# Patient Record
Sex: MALE | Race: BLACK OR AFRICAN AMERICAN | Employment: UNEMPLOYED | URBAN - METROPOLITAN AREA
[De-identification: names, ages, dates, MRNs, and addresses within clinical notes are randomized per-mention and may not be internally consistent; named-entity substitution may affect disease eponyms.]

---

## 2018-04-03 ENCOUNTER — APPOINTMENT (OUTPATIENT)
Dept: GENERAL RADIOLOGY | Age: 43
DRG: 193 | End: 2018-04-03
Attending: EMERGENCY MEDICINE
Payer: MEDICARE

## 2018-04-03 ENCOUNTER — HOSPITAL ENCOUNTER (INPATIENT)
Age: 43
LOS: 2 days | Discharge: HOME HEALTH | DRG: 193 | End: 2018-04-06
Attending: EMERGENCY MEDICINE | Admitting: INTERNAL MEDICINE
Payer: MEDICARE

## 2018-04-03 DIAGNOSIS — J18.9 COMMUNITY ACQUIRED PNEUMONIA OF LEFT LOWER LOBE OF LUNG: Primary | ICD-10-CM

## 2018-04-03 DIAGNOSIS — G83.9 SPASTIC PARALYSIS (HCC): ICD-10-CM

## 2018-04-03 DIAGNOSIS — R09.02 HYPOXIA: ICD-10-CM

## 2018-04-03 LAB
ALBUMIN SERPL-MCNC: 3.3 G/DL (ref 3.4–5)
ALBUMIN/GLOB SERPL: 1 {RATIO} (ref 0.8–1.7)
ALP SERPL-CCNC: 69 U/L (ref 45–117)
ALT SERPL-CCNC: 26 U/L (ref 16–61)
ANION GAP SERPL CALC-SCNC: 7 MMOL/L (ref 3–18)
APPEARANCE UR: CLEAR
AST SERPL-CCNC: 32 U/L (ref 15–37)
BASOPHILS # BLD: 0 K/UL (ref 0–0.1)
BASOPHILS NFR BLD: 0 % (ref 0–2)
BILIRUB SERPL-MCNC: 0.5 MG/DL (ref 0.2–1)
BILIRUB UR QL: NEGATIVE
BUN SERPL-MCNC: 9 MG/DL (ref 7–18)
BUN/CREAT SERPL: 10 (ref 12–20)
CALCIUM SERPL-MCNC: 8.3 MG/DL (ref 8.5–10.1)
CHLORIDE SERPL-SCNC: 107 MMOL/L (ref 100–108)
CO2 SERPL-SCNC: 26 MMOL/L (ref 21–32)
COLOR UR: YELLOW
CREAT SERPL-MCNC: 0.89 MG/DL (ref 0.6–1.3)
DIFFERENTIAL METHOD BLD: ABNORMAL
EOSINOPHIL # BLD: 0 K/UL (ref 0–0.4)
EOSINOPHIL NFR BLD: 0 % (ref 0–5)
ERYTHROCYTE [DISTWIDTH] IN BLOOD BY AUTOMATED COUNT: 12.8 % (ref 11.6–14.5)
FLUAV AG NPH QL IA: NEGATIVE
FLUBV AG NOSE QL IA: NEGATIVE
GLOBULIN SER CALC-MCNC: 3.3 G/DL (ref 2–4)
GLUCOSE SERPL-MCNC: 105 MG/DL (ref 74–99)
GLUCOSE UR STRIP.AUTO-MCNC: NEGATIVE MG/DL
HCT VFR BLD AUTO: 36.8 % (ref 36–48)
HGB BLD-MCNC: 12.5 G/DL (ref 13–16)
HGB UR QL STRIP: NEGATIVE
KETONES UR QL STRIP.AUTO: ABNORMAL MG/DL
LACTATE BLD-SCNC: 0.5 MMOL/L (ref 0.4–2)
LEUKOCYTE ESTERASE UR QL STRIP.AUTO: NEGATIVE
LYMPHOCYTES # BLD: 0.4 K/UL (ref 0.9–3.6)
LYMPHOCYTES NFR BLD: 8 % (ref 21–52)
MAGNESIUM SERPL-MCNC: 2 MG/DL (ref 1.6–2.6)
MCH RBC QN AUTO: 31.5 PG (ref 24–34)
MCHC RBC AUTO-ENTMCNC: 34 G/DL (ref 31–37)
MCV RBC AUTO: 92.7 FL (ref 74–97)
MONOCYTES # BLD: 0.3 K/UL (ref 0.05–1.2)
MONOCYTES NFR BLD: 7 % (ref 3–10)
NEUTS SEG # BLD: 4.1 K/UL (ref 1.8–8)
NEUTS SEG NFR BLD: 85 % (ref 40–73)
NITRITE UR QL STRIP.AUTO: NEGATIVE
PH UR STRIP: 6.5 [PH] (ref 5–8)
PLATELET # BLD AUTO: 201 K/UL (ref 135–420)
PMV BLD AUTO: 10.1 FL (ref 9.2–11.8)
POTASSIUM SERPL-SCNC: 4 MMOL/L (ref 3.5–5.5)
PROT SERPL-MCNC: 6.6 G/DL (ref 6.4–8.2)
PROT UR STRIP-MCNC: NEGATIVE MG/DL
RBC # BLD AUTO: 3.97 M/UL (ref 4.7–5.5)
SODIUM SERPL-SCNC: 140 MMOL/L (ref 136–145)
SP GR UR REFRACTOMETRY: 1.01 (ref 1–1.03)
T4 FREE SERPL-MCNC: 0.9 NG/DL (ref 0.7–1.5)
TSH SERPL DL<=0.05 MIU/L-ACNC: 0.23 UIU/ML (ref 0.36–3.74)
UROBILINOGEN UR QL STRIP.AUTO: 1 EU/DL (ref 0.2–1)
WBC # BLD AUTO: 4.8 K/UL (ref 4.6–13.2)

## 2018-04-03 PROCEDURE — 83735 ASSAY OF MAGNESIUM: CPT | Performed by: EMERGENCY MEDICINE

## 2018-04-03 PROCEDURE — 87040 BLOOD CULTURE FOR BACTERIA: CPT | Performed by: EMERGENCY MEDICINE

## 2018-04-03 PROCEDURE — 81003 URINALYSIS AUTO W/O SCOPE: CPT | Performed by: EMERGENCY MEDICINE

## 2018-04-03 PROCEDURE — 87804 INFLUENZA ASSAY W/OPTIC: CPT | Performed by: EMERGENCY MEDICINE

## 2018-04-03 PROCEDURE — 84443 ASSAY THYROID STIM HORMONE: CPT | Performed by: EMERGENCY MEDICINE

## 2018-04-03 PROCEDURE — 77030013033 HC MSK BPAP/CPAP MMKA -B

## 2018-04-03 PROCEDURE — 77030005514 HC CATH URETH FOL14 BARD -A

## 2018-04-03 PROCEDURE — 99285 EMERGENCY DEPT VISIT HI MDM: CPT

## 2018-04-03 PROCEDURE — 87086 URINE CULTURE/COLONY COUNT: CPT | Performed by: EMERGENCY MEDICINE

## 2018-04-03 PROCEDURE — 84439 ASSAY OF FREE THYROXINE: CPT | Performed by: EMERGENCY MEDICINE

## 2018-04-03 PROCEDURE — 85025 COMPLETE CBC W/AUTO DIFF WBC: CPT | Performed by: EMERGENCY MEDICINE

## 2018-04-03 PROCEDURE — 74011250637 HC RX REV CODE- 250/637: Performed by: EMERGENCY MEDICINE

## 2018-04-03 PROCEDURE — 83605 ASSAY OF LACTIC ACID: CPT

## 2018-04-03 PROCEDURE — 71045 X-RAY EXAM CHEST 1 VIEW: CPT

## 2018-04-03 PROCEDURE — 93005 ELECTROCARDIOGRAM TRACING: CPT

## 2018-04-03 PROCEDURE — 80053 COMPREHEN METABOLIC PANEL: CPT | Performed by: EMERGENCY MEDICINE

## 2018-04-03 PROCEDURE — 96361 HYDRATE IV INFUSION ADD-ON: CPT

## 2018-04-03 PROCEDURE — 74011250636 HC RX REV CODE- 250/636: Performed by: EMERGENCY MEDICINE

## 2018-04-03 RX ORDER — SODIUM CHLORIDE 0.9 % (FLUSH) 0.9 %
5-10 SYRINGE (ML) INJECTION AS NEEDED
Status: DISCONTINUED | OUTPATIENT
Start: 2018-04-03 | End: 2018-04-06 | Stop reason: HOSPADM

## 2018-04-03 RX ORDER — ACETAMINOPHEN 500 MG
1000 TABLET ORAL
Status: COMPLETED | OUTPATIENT
Start: 2018-04-03 | End: 2018-04-03

## 2018-04-03 RX ADMIN — ACETAMINOPHEN 1000 MG: 500 TABLET ORAL at 23:03

## 2018-04-03 RX ADMIN — SODIUM CHLORIDE 1770 ML: 900 INJECTION, SOLUTION INTRAVENOUS at 20:34

## 2018-04-03 NOTE — IP AVS SNAPSHOT
[address] [place] [address] 
[phone] Patient: Shoshana Armstrong MRN: [id number] :1975 A check idania indicates which time of day the medication should be taken. My Medications START taking these medications Instructions Each Dose to Equal  
 Morning Noon Evening Bedtime  
 levoFLOXacin 750 mg tablet Commonly known as:  Opal Maria Victoria Your last dose was: Your next dose is: Take 1 Tab by mouth daily for 7 days. 750 mg  
    
   
   
   
  
 oxyCODONE-acetaminophen 5-325 mg per tablet Commonly known as:  PERCOCET Your last dose was: Your next dose is: Take 1 Tab by mouth every four (4) hours as needed. Max Daily Amount: 6 Tabs. 1 Tab CONTINUE taking these medications Instructions Each Dose to Equal  
 Morning Noon Evening Bedtime BACLOFEN PO Your last dose was: Your next dose is: Take  by mouth. tamsulosin 0.4 mg capsule Commonly known as:  FLOMAX Your last dose was: Your next dose is: Take 1 Cap by mouth daily (after dinner). 0.4 mg  
    
   
   
   
  
 VALIUM PO Your last dose was: Your next dose is: Take  by mouth. VITAMIN D2 50,000 unit capsule Generic drug:  ergocalciferol Your last dose was: Your next dose is: Take 50,000 Units by mouth. 22406 Units Where to Get Your Medications Information on where to get these meds will be given to you by the nurse or doctor. ! Ask your nurse or doctor about these medications  
  levoFLOXacin 750 mg tablet  
 oxyCODONE-acetaminophen 5-325 mg per tablet

## 2018-04-03 NOTE — IP AVS SNAPSHOT
[address] [address] 
[phone] Patient: Flavio Hernandez MRN: [id number] :1975 About your hospitalization You were admitted on:  2018 You last received care in the:  [facility] 2 CV STEPDOWN You were discharged on:  2018 Why you were hospitalized Your primary diagnosis was:  Not on File Your diagnoses also included:  Cap (Community Acquired Pneumonia) Follow-up Information Follow up With Details Comments Contact Info Yuni Michael MD On 2018 @[address] [address] 
[phone] Discharge Orders None A check idania indicates which time of day the medication should be taken. My Medications START taking these medications Instructions Each Dose to Equal  
 Morning Noon Evening Bedtime  
 levoFLOXacin 750 mg tablet Commonly known as:  Shania Mexia Your last dose was: Your next dose is: Take 1 Tab by mouth daily for 7 days. 750 mg  
    
   
   
   
  
 oxyCODONE-acetaminophen 5-325 mg per tablet Commonly known as:  PERCOCET Your last dose was: Your next dose is: Take 1 Tab by mouth every four (4) hours as needed. Max Daily Amount: 6 Tabs. 1 Tab CONTINUE taking these medications Instructions Each Dose to Equal  
 Morning Noon Evening Bedtime BACLOFEN PO Your last dose was: Your next dose is: Take  by mouth. tamsulosin 0.4 mg capsule Commonly known as:  FLOMAX Your last dose was: Your next dose is: Take 1 Cap by mouth daily (after dinner). 0.4 mg  
    
   
   
   
  
 VALIUM PO Your last dose was: Your next dose is: Take  by mouth. VITAMIN D2 50,000 unit capsule Generic drug:  ergocalciferol Your last dose was: Your next dose is: Take 50,000 Units by mouth. 91873 Units Where to Get Your Medications Information on where to get these meds will be given to you by the nurse or doctor. ! Ask your nurse or doctor about these medications  
  levoFLOXacin 750 mg tablet  
 oxyCODONE-acetaminophen 5-325 mg per tablet Opioid Education Prescription Opioids: What You Need to Know: 
 
Prescription opioids can be used to help relieve moderate-to-severe pain and are often prescribed following a surgery or injury, or for certain health conditions. These medications can be an important part of treatment but also come with serious risks. Opioids are strong pain medicines. Examples include hydrocodone, oxycodone, fentanyl, and morphine. Heroin is an example of an illegal opioid. It is important to work with your health care provider to make sure you are getting the safest, most effective care. WHAT ARE THE RISKS AND SIDE EFFECTS OF OPIOID USE? Prescription opioids carry serious risks of addiction and overdose, especially with prolonged use. An opioid overdose, often marked by slow breathing, can cause sudden death. The use of prescription opioids can have a number of side effects as well, even when taken as directed. · Tolerance-meaning you might need to take more of a medication for the same pain relief · Physical dependence-meaning you have symptoms of withdrawal when the medication is stopped. Withdrawal symptoms can include nausea, sweating, chills, diarrhea, stomach cramps, and muscle aches. Withdrawal can last up to several weeks, depending on which drug you took and how long you took it. · Increased sensitivity to pain · Constipation · Nausea, vomiting, and dry mouth · Sleepiness and dizziness · Confusion · Depression · Low levels of testosterone that can result in lower sex drive, energy, and strength · Itching and sweating RISKS ARE GREATER WITH:      
· History of drug misuse, substance use disorder, or overdose · Mental health conditions (such as depression or anxiety) · Sleep apnea · Older age (72 years or older) · Pregnancy Avoid alcohol while taking prescription opioids. Also, unless specifically advised by your health care provider, medications to avoid include: · Benzodiazepines (such as Xanax or Valium) · Muscle relaxants (such as Soma or Flexeril) · Hypnotics (such as Ambien or Lunesta) · Other prescription opioids KNOW YOUR OPTIONS Talk to your health care provider about ways to manage your pain that don't involve prescription opioids. Some of these options may actually work better and have fewer risks and side effects. Options may include: 
· Pain relievers such as acetaminophen, ibuprofen, and naproxen · Some medications that are also used for depression or seizures · Physical therapy and exercise · Counseling to help patients learn how to cope better with triggers of pain and stress. · Application of heat or cold compress · Massage therapy · Relaxation techniques Be Informed Make sure you know the name of your medication, how much and how often to take it, and its potential risks & side effects. IF YOU ARE PRESCRIBED OPIOIDS FOR PAIN: 
· Never take opioids in greater amounts or more often than prescribed. Remember the goal is not to be pain-free but to manage your pain at a tolerable level. · Follow up with your primary care provider to: · Work together to create a plan on how to manage your pain. · Talk about ways to help manage your pain that don't involve prescription opioids. · Talk about any and all concerns and side effects. · Help prevent misuse and abuse. · Never sell or share prescription opioids · Help prevent misuse and abuse.  
· Store prescription opioids in a secure place and out of reach of others (this may include visitors, children, friends, and family). · Safely dispose of unused/unwanted prescription opioids: Find your community drug take-back program or your pharmacy mail-back program, or flush them down the toilet, following guidance from the Food and Drug Administration (www.fda.gov/Drugs/ResourcesForYou). · Visit www.cdc.gov/drugoverdose to learn about the risks of opioid abuse and overdose. · If you believe you may be struggling with addiction, tell your health care provider and ask for guidance or call [facility] [employer] at 4-126-666-OMWP. Discharge Instructions Pneumonia: Care Instructions Your Care Instructions Pneumonia is an infection of the lungs. Most cases are caused by infections from bacteria or viruses. Pneumonia may be mild or very severe. If it is caused by bacteria, you will be treated with antibiotics. It may take a few weeks to a few months to recover fully from pneumonia, depending on how sick you were and whether your overall health is good. Follow-up care is a key part of your treatment and safety. Be sure to make and go to all appointments, and call your doctor if you are having problems. It's also a good idea to know your test results and keep a list of the medicines you take. How can you care for yourself at home? · Take your antibiotics exactly as directed. Do not stop taking the medicine just because you are feeling better. You need to take the full course of antibiotics. · Take your medicines exactly as prescribed. Call your doctor if you think you are having a problem with your medicine. · Get plenty of rest and sleep. You may feel weak and tired for a while, but your energy level will improve with time. · To prevent dehydration, drink plenty of fluids, enough so that your urine is light yellow or clear like water.  Choose water and other caffeine-free clear liquids until you feel better. If you have kidney, heart, or liver disease and have to limit fluids, talk with your doctor before you increase the amount of fluids you drink. · Take care of your cough so you can rest. A cough that brings up mucus from your lungs is common with pneumonia. It is one way your body gets rid of the infection. But if coughing keeps you from resting or causes severe fatigue and chest-wall pain, talk to your doctor. He or she may suggest that you take a medicine to reduce the cough. · Use a vaporizer or humidifier to add moisture to your bedroom. Follow the directions for cleaning the machine. · Do not smoke or allow others to smoke around you. Smoke will make your cough last longer. If you need help quitting, talk to your doctor about stop-smoking programs and medicines. These can increase your chances of quitting for good. · Take an over-the-counter pain medicine, such as acetaminophen (Tylenol), ibuprofen (Advil, Motrin), or naproxen (Aleve). Read and follow all instructions on the label. · Do not take two or more pain medicines at the same time unless the doctor told you to. Many pain medicines have acetaminophen, which is Tylenol. Too much acetaminophen (Tylenol) can be harmful. · If you were given a spirometer to measure how well your lungs are working, use it as instructed. This can help your doctor tell how your recovery is going. · To prevent pneumonia in the future, talk to your doctor about getting a flu vaccine (once a year) and a pneumococcal vaccine (one time only for most people). When should you call for help? Call 911 anytime you think you may need emergency care. For example, call if: 
? · You have severe trouble breathing. ?Call your doctor now or seek immediate medical care if: 
? · You cough up dark brown or bloody mucus (sputum). ? · You have new or worse trouble breathing. ? · You are dizzy or lightheaded, or you feel like you may faint. ? Watch closely for changes in your health, and be sure to contact your doctor if: 
? · You have a new or higher fever. ? · You are coughing more deeply or more often. ? · You are not getting better after 2 days (48 hours). ? · You do not get better as expected. Where can you learn more? Go to http://mya-martina.info/. Enter [id number] in the search box to learn more about \"Pneumonia: Care Instructions. \" Current as of: May 12, 2017 Content Version: 11.4 © 1441-9673 [employer]. Care instructions adapted under license by [employer] (which disclaims liability or warranty for this information). If you have questions about a medical condition or this instruction, always ask your healthcare professional. [place] [address] any warranty or liability for your use of this information. [employer] Announcement We are excited to announce that we are making your provider's discharge notes available to you in [employer]. You will see these notes when they are completed and signed by the physician that discharged you from your recent hospital stay. If you have any questions or concerns about any information you see in [employer], please call the Health Information Department where you were seen or reach out to your Primary Care Provider for more information about your plan of care. Introducing [facility]! Christoph Solomon introduces [employer] patient portal. Now you can access parts of your medical record, email your doctor's office, and request medication refills online. 1. In your internet browser, go to [email or web address] 2. Click on the First Time User? Click Here link in the Sign In box. You will see the New Member Sign Up page. 3. Enter your [employer] Access Code exactly as it appears below.  You will not need to use this code after youve completed the sign-up process. If you do not sign up before the expiration date, you must request a new code. · [employer] Access Code: [email or web address] Expires: 4/22/2018  2:53 PM 
 
4. Enter the last four digits of your Social Security Number (xxxx) and Date of Birth (mm/dd/yyyy) as indicated and click Submit. You will be taken to the next sign-up page. 5. Create a [employer] ID. This will be your [employer] login ID and cannot be changed, so think of one that is secure and easy to remember. 6. Create a [employer] password. You can change your password at any time. 7. Enter your Password Reset Question and Answer. This can be used at a later time if you forget your password. 8. Enter your e-mail address. You will receive e-mail notification when new information is available in [address] 9. Click Sign Up. You can now view and download portions of your medical record. 10. Click the Download Summary menu link to download a portable copy of your medical information. If you have questions, please visit the Frequently Asked Questions section of the [employer] website. Remember, [employer] is NOT to be used for urgent needs. For medical emergencies, dial 911. Now available from your iPhone and Android! Introducing Moncho Puga As a [facility] patient, I wanted to make you aware of our electronic visit tool called Moncho Jorge Albertobridgetmagi. [facility] 24/7 allows you to connect within minutes with a medical provider 24 hours a day, seven days a week via a mobile device or tablet or logging into a secure website from your computer. You can access Moncho Puga from anywhere in the [place].  
 
A virtual visit might be right for you when you have a simple condition and feel like you just dont want to get out of bed, or cant get away from work for an appointment, when your regular [facility] provider is not available (evenings, weekends or holidays), or when youre out of town and need minor care. Electronic visits cost only $49 and if the [employer] 24/7 provider determines a prescription is needed to treat your condition, one can be electronically transmitted to a nearby pharmacy*. Please take a moment to enroll today if you have not already done so. The enrollment process is free and takes just a few minutes. To enroll, please download the [employer] 24/7 viola to your tablet or phone, or visit [email or web address] to enroll on your computer. And, as an [address] [place] [address] patient with a [employer] account, the results of your visits will be scanned into your electronic medical record and your primary care provider will be able to view the scanned results. We urge you to continue to see your regular [employer] provider for your ongoing medical care. And while your primary care provider may not be the one available when you seek a [employer] virtual visit, the peace of mind you get from getting a real diagnosis real time can be priceless. For more information on [employer], view our Frequently Asked Questions (FAQs) at [email or web address]. Sincerely, 
 
Amira Cornelius MD 
Chief Medical Officer [employer] *:  certain medications cannot be prescribed via [employer] Unresulted Labs-Please follow up with your PCP about these lab tests Order Current Status CULTURE, BLOOD Preliminary result CULTURE, BLOOD Preliminary result Providers Seen During Your Hospitalization Provider Specialty Primary office phone Ash Trejo MD Emergency Medicine [phone] Katja Cardoso MD Internal Medicine [phone] Lucas Flor MD Family Practice [phone] Immunizations Administered for This Admission Name Date Influenza Vaccine (Quad) PF  Deferred () Your Primary Care Physician (PCP) Primary Care Physician Office Phone Office Fax Clarissa Vergara [phone] You are allergic to the following No active allergies Recent Documentation Height Weight BMI Smoking Status 1.727 m 66.6 kg 22.34 kg/m2 Current Every Day Smoker Emergency Contacts Name Discharge Info Relation Home Work Mobile Rosalva Nicoley DISCHARGE CAREGIVER [3] Other Relative [6] [phone] Patient Belongings The following personal items are in your possession at time of discharge: 
  Dental Appliances: None  Visual Aid: None      Home Medications: None   Jewelry: With patient, Earrings  Clothing: None    Other Valuables: None Please provide this summary of care documentation to your next provider. Signatures-by signing, you are acknowledging that this After Visit Summary has been reviewed with you and you have received a copy. Patient Signature:  ____________________________________________________________ Date:  ____________________________________________________________  
  
[facility] Provider Signature:  ____________________________________________________________ Date:  ____________________________________________________________

## 2018-04-03 NOTE — LETTER
NOTIFICATION RETURN TO WORK 
 
4/4/2018 4:29 AM 
 
Ms. Yousif Jones [address] To Whom It May Concern: Ms. Stormy Burris has [facility] the [facility]. She will return to work on: Thursday, April 5, 2018 If there are questions or concerns please have the patient contact our office.  
 
 
 
Sincerely, 
 
 
 
Nasim Higuera MD

## 2018-04-04 PROBLEM — J18.9 CAP (COMMUNITY ACQUIRED PNEUMONIA): Status: ACTIVE | Noted: 2018-04-04

## 2018-04-04 LAB
ARTERIAL PATENCY WRIST A: YES
ATRIAL RATE: 102 BPM
BASE DEFICIT BLD-SCNC: 5 MMOL/L
BDY SITE: ABNORMAL
CALCULATED P AXIS, ECG09: 77 DEGREES
CALCULATED R AXIS, ECG10: 57 DEGREES
CALCULATED T AXIS, ECG11: 55 DEGREES
DIAGNOSIS, 93000: NORMAL
GAS FLOW.O2 O2 DELIVERY SYS: ABNORMAL L/MIN
HCO3 BLD-SCNC: 20.3 MMOL/L (ref 22–26)
O2/TOTAL GAS SETTING VFR VENT: 0.6 %
P-R INTERVAL, ECG05: 166 MS
PCO2 BLD: 37.3 MMHG (ref 35–45)
PEEP RESPIRATORY: 5 CMH2O
PH BLD: 7.34 [PH] (ref 7.35–7.45)
PIP ISTAT,IPIP: 20
PO2 BLD: 192 MMHG (ref 80–100)
Q-T INTERVAL, ECG07: 344 MS
QRS DURATION, ECG06: 68 MS
QTC CALCULATION (BEZET), ECG08: 448 MS
SAO2 % BLD: 100 % (ref 92–97)
SERVICE CMNT-IMP: ABNORMAL
SPECIMEN TYPE: ABNORMAL
SPONTANEOUS TIMED, IST: YES
TOTAL RESP. RATE, ITRR: 19
VENTRICULAR RATE, ECG03: 102 BPM

## 2018-04-04 PROCEDURE — 74011250636 HC RX REV CODE- 250/636

## 2018-04-04 PROCEDURE — 74011250637 HC RX REV CODE- 250/637: Performed by: FAMILY MEDICINE

## 2018-04-04 PROCEDURE — 96365 THER/PROPH/DIAG IV INF INIT: CPT

## 2018-04-04 PROCEDURE — 96375 TX/PRO/DX INJ NEW DRUG ADDON: CPT

## 2018-04-04 PROCEDURE — 74011250637 HC RX REV CODE- 250/637: Performed by: EMERGENCY MEDICINE

## 2018-04-04 PROCEDURE — 94660 CPAP INITIATION&MGMT: CPT

## 2018-04-04 PROCEDURE — 74011250636 HC RX REV CODE- 250/636: Performed by: EMERGENCY MEDICINE

## 2018-04-04 PROCEDURE — 77010033678 HC OXYGEN DAILY

## 2018-04-04 PROCEDURE — 82803 BLOOD GASES ANY COMBINATION: CPT

## 2018-04-04 PROCEDURE — 36600 WITHDRAWAL OF ARTERIAL BLOOD: CPT

## 2018-04-04 PROCEDURE — 65660000004 HC RM CVT STEPDOWN

## 2018-04-04 RX ORDER — ENOXAPARIN SODIUM 100 MG/ML
40 INJECTION SUBCUTANEOUS EVERY 24 HOURS
Status: DISCONTINUED | OUTPATIENT
Start: 2018-04-04 | End: 2018-04-06 | Stop reason: HOSPADM

## 2018-04-04 RX ORDER — BACLOFEN 10 MG/1
20 TABLET ORAL
Status: COMPLETED | OUTPATIENT
Start: 2018-04-04 | End: 2018-04-04

## 2018-04-04 RX ORDER — LORAZEPAM 2 MG/ML
INJECTION INTRAMUSCULAR
Status: COMPLETED
Start: 2018-04-04 | End: 2018-04-04

## 2018-04-04 RX ORDER — BACLOFEN 10 MG/1
20 TABLET ORAL 4 TIMES DAILY
Status: DISCONTINUED | OUTPATIENT
Start: 2018-04-04 | End: 2018-04-04 | Stop reason: SDUPTHER

## 2018-04-04 RX ORDER — BACLOFEN 10 MG/1
20 TABLET ORAL
Status: DISCONTINUED | OUTPATIENT
Start: 2018-04-04 | End: 2018-04-05

## 2018-04-04 RX ORDER — LORAZEPAM 2 MG/ML
1 INJECTION INTRAMUSCULAR
Status: COMPLETED | OUTPATIENT
Start: 2018-04-04 | End: 2018-04-04

## 2018-04-04 RX ORDER — ONDANSETRON 2 MG/ML
4 INJECTION INTRAMUSCULAR; INTRAVENOUS
Status: DISCONTINUED | OUTPATIENT
Start: 2018-04-04 | End: 2018-04-06 | Stop reason: HOSPADM

## 2018-04-04 RX ORDER — OXYCODONE AND ACETAMINOPHEN 5; 325 MG/1; MG/1
1 TABLET ORAL
Status: DISCONTINUED | OUTPATIENT
Start: 2018-04-04 | End: 2018-04-06 | Stop reason: HOSPADM

## 2018-04-04 RX ORDER — LEVOFLOXACIN 5 MG/ML
750 INJECTION, SOLUTION INTRAVENOUS
Status: COMPLETED | OUTPATIENT
Start: 2018-04-04 | End: 2018-04-04

## 2018-04-04 RX ORDER — TAMSULOSIN HYDROCHLORIDE 0.4 MG/1
0.4 CAPSULE ORAL
Status: DISCONTINUED | OUTPATIENT
Start: 2018-04-04 | End: 2018-04-06 | Stop reason: HOSPADM

## 2018-04-04 RX ORDER — LEVOFLOXACIN 5 MG/ML
750 INJECTION, SOLUTION INTRAVENOUS EVERY 24 HOURS
Status: DISCONTINUED | OUTPATIENT
Start: 2018-04-05 | End: 2018-04-06 | Stop reason: HOSPADM

## 2018-04-04 RX ORDER — ACETAMINOPHEN 325 MG/1
650 TABLET ORAL
Status: DISCONTINUED | OUTPATIENT
Start: 2018-04-04 | End: 2018-04-06 | Stop reason: HOSPADM

## 2018-04-04 RX ADMIN — Medication 10 ML: at 02:23

## 2018-04-04 RX ADMIN — LEVOFLOXACIN 750 MG: 5 INJECTION, SOLUTION INTRAVENOUS at 02:23

## 2018-04-04 RX ADMIN — BACLOFEN 20 MG: 10 TABLET ORAL at 02:21

## 2018-04-04 RX ADMIN — LORAZEPAM 1 MG: 2 INJECTION INTRAMUSCULAR at 07:32

## 2018-04-04 RX ADMIN — OXYCODONE HYDROCHLORIDE AND ACETAMINOPHEN 1 TABLET: 5; 325 TABLET ORAL at 12:22

## 2018-04-04 RX ADMIN — ACETAMINOPHEN 650 MG: 325 TABLET ORAL at 18:07

## 2018-04-04 RX ADMIN — BACLOFEN 20 MG: 10 TABLET ORAL at 12:22

## 2018-04-04 RX ADMIN — OXYCODONE HYDROCHLORIDE AND ACETAMINOPHEN 1 TABLET: 5; 325 TABLET ORAL at 19:57

## 2018-04-04 RX ADMIN — LORAZEPAM 1 MG: 2 INJECTION INTRAMUSCULAR; INTRAVENOUS at 02:22

## 2018-04-04 RX ADMIN — TAMSULOSIN HYDROCHLORIDE 0.4 MG: 0.4 CAPSULE ORAL at 18:07

## 2018-04-04 RX ADMIN — BACLOFEN 20 MG: 10 TABLET ORAL at 18:06

## 2018-04-04 RX ADMIN — LORAZEPAM 1 MG: 2 INJECTION INTRAMUSCULAR; INTRAVENOUS at 07:32

## 2018-04-04 RX ADMIN — BACLOFEN 20 MG: 10 TABLET ORAL at 09:14

## 2018-04-04 NOTE — ED NOTES
TRANSFER - OUT REPORT:    Verbal report given to VELVET Briceño(name) on Aneta Rater  being transferred to ICU(unit) for routine progression of care       Report consisted of patients Situation, Background, Assessment and   Recommendations(SBAR). Information from the following report(s) SBAR, ED Summary, Intake/Output, MAR, Recent Results and Cardiac Rhythm NSR was reviewed with the receiving nurse. Lines:   Peripheral IV 04/03/18 Right Antecubital (Active)   Site Assessment Clean, dry, & intact 4/4/2018  6:01 AM   Phlebitis Assessment 0 4/4/2018  6:01 AM   Infiltration Assessment 0 4/4/2018  6:01 AM   Dressing Status Clean, dry, & intact 4/4/2018  6:01 AM   Dressing Type Transparent 4/4/2018  6:01 AM   Hub Color/Line Status Pink 4/4/2018  6:01 AM       Peripheral IV 04/03/18 Left Forearm (Active)   Site Assessment Clean, dry, & intact 4/4/2018  6:01 AM   Phlebitis Assessment 0 4/4/2018  6:01 AM   Infiltration Assessment 0 4/4/2018  6:01 AM   Dressing Status Clean, dry, & intact 4/4/2018  6:01 AM   Dressing Type Transparent 4/4/2018  6:01 AM   Hub Color/Line Status Pink 4/4/2018  6:01 AM        Opportunity for questions and clarification was provided.       Patient transported with:   Monitor  Registered Nurse   Dillan  RT
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
## 2018-04-04 NOTE — ED TRIAGE NOTES
Per caregiver pt had a fever of 104.0 at facility. Per caregiver pt stated it hurt when he urinated, despite having a vasquez in. Caregiver admin 650mg tylenol 1.5hr prior to ER visit.
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
## 2018-04-04 NOTE — PROGRESS NOTES
Patient transported from ER bed 11 to ICU bed 304, on V60 BiPAP, without incident.     BiPAP Check:     04/04/18 0809   Oxygen Therapy   O2 Device BIPAP   O2 Temperature (not heated)   FIO2 (%) 60 %   Respiratory   Respiratory (WDL) X   Respiratory Pattern Tachypneic   CPAP/BIPAP   CPAP/BIPAP Start/Stop On   Device Mode S/T   Mask Type and Size Full face   Skin Condition Good   PIP Observed 20 cm H20   IPAP (cm H2O) 20 cm H2O   EPAP (cm H2O) 5 cm H2O   Inspiratory Time (sec) 1 seconds   Vt Spont (ml) 711 ml   Ve Observed (l/min) 26.4 l/min   Backup Rate 8   Total RR (Spontaneous) 37 breaths per minute   Insp Rise Time (sec) 2   Leak (Estimated) 35 L/min   Pt's Home Machine No   Biomedical Check Performed Yes   Settings Verified Yes   Alarm Settings   High Pressure 30   Low Pressure 15   Apnea 30   Low Ve 2   High Rate 45   Low Rate 6

## 2018-04-04 NOTE — ED NOTES
Pt sleeping on stretcher with no acute distress noted and stable vital signs. Grissom draining and BiPAP in place. Family has gone home at this time. Will continue to monitor pt and await bed assignment.

## 2018-04-04 NOTE — ED NOTES
Patient given medications at this time. Patient tolerated well. Patient c/o pain when attempting to urinate. Patient has a vasquez in place. Patient has no additional wants or needs. Call bell bell within reach. Will continue to monitor.

## 2018-04-04 NOTE — ED NOTES
Pt was found with BiPAP disconnected from mask. It appeared that it had been broken. Mask replaced. Pt reports pain and requested medication. Hospitalist paged. Awaiting RT to transport pt to ICU.

## 2018-04-04 NOTE — ED TRIAGE NOTES
Pt brought in [facility] from home d/t a fever. Pt states he had a fever of 104.1 at home and was given 650mg of Tylenol about an hour and a half ago. Pt denies any illness at this time. Pt A & O X 3, follows commands, no distress noted.

## 2018-04-04 NOTE — ED NOTES
Pt resting more comfortably on stretcher with BiPAP on and family at bedside. Will continue to monitor pt and await further orders.

## 2018-04-04 NOTE — ROUTINE PROCESS
Bedside and Verbal shift change report given to Tamara Fournier RN (oncoming nurse) by Liv Middleton RN   (offgoing nurse). Report included the following information SBAR, Kardex, Intake/Output, MAR, Accordion, Recent Results and Med Rec Status.

## 2018-04-04 NOTE — ED NOTES
Pt sleeping on stretcher with no acute distress noted and stable vitals. BiPAP in place, with no issues. Grissom draining. Will continue to monitor pt and await further orders.

## 2018-04-04 NOTE — ED NOTES
Pt is diaphoretic and requested BiPap removed for short time because he was very uncomfortable. PT states he had a muscle cramp in his right hamstring. Pt dropped to 79% on RA. RN replaced BiPAP. Had RT repaged to assist pt and assist with transport to ICU. PT returned to 96% on BiPAP. Pt given 1mg Ativan IV.

## 2018-04-05 LAB
ANION GAP SERPL CALC-SCNC: 9 MMOL/L (ref 3–18)
BACTERIA SPEC CULT: NORMAL
BASOPHILS # BLD: 0 K/UL (ref 0–0.1)
BASOPHILS NFR BLD: 0 % (ref 0–2)
BUN SERPL-MCNC: 9 MG/DL (ref 7–18)
BUN/CREAT SERPL: 16 (ref 12–20)
CALCIUM SERPL-MCNC: 7.9 MG/DL (ref 8.5–10.1)
CHLORIDE SERPL-SCNC: 103 MMOL/L (ref 100–108)
CO2 SERPL-SCNC: 26 MMOL/L (ref 21–32)
CREAT SERPL-MCNC: 0.56 MG/DL (ref 0.6–1.3)
DIFFERENTIAL METHOD BLD: ABNORMAL
EOSINOPHIL # BLD: 0 K/UL (ref 0–0.4)
EOSINOPHIL NFR BLD: 0 % (ref 0–5)
ERYTHROCYTE [DISTWIDTH] IN BLOOD BY AUTOMATED COUNT: 12.8 % (ref 11.6–14.5)
GLUCOSE SERPL-MCNC: 96 MG/DL (ref 74–99)
HCT VFR BLD AUTO: 38.2 % (ref 36–48)
HGB BLD-MCNC: 12.9 G/DL (ref 13–16)
LYMPHOCYTES # BLD: 0.8 K/UL (ref 0.9–3.6)
LYMPHOCYTES NFR BLD: 31 % (ref 21–52)
MCH RBC QN AUTO: 31.5 PG (ref 24–34)
MCHC RBC AUTO-ENTMCNC: 33.8 G/DL (ref 31–37)
MCV RBC AUTO: 93.2 FL (ref 74–97)
MONOCYTES # BLD: 0.1 K/UL (ref 0.05–1.2)
MONOCYTES NFR BLD: 5 % (ref 3–10)
NEUTS SEG # BLD: 1.7 K/UL (ref 1.8–8)
NEUTS SEG NFR BLD: 64 % (ref 40–73)
PLATELET # BLD AUTO: 166 K/UL (ref 135–420)
PMV BLD AUTO: 10.4 FL (ref 9.2–11.8)
POTASSIUM SERPL-SCNC: 3.5 MMOL/L (ref 3.5–5.5)
RBC # BLD AUTO: 4.1 M/UL (ref 4.7–5.5)
SERVICE CMNT-IMP: NORMAL
SODIUM SERPL-SCNC: 138 MMOL/L (ref 136–145)
WBC # BLD AUTO: 2.7 K/UL (ref 4.6–13.2)

## 2018-04-05 PROCEDURE — 74011250636 HC RX REV CODE- 250/636: Performed by: FAMILY MEDICINE

## 2018-04-05 PROCEDURE — 77010033678 HC OXYGEN DAILY

## 2018-04-05 PROCEDURE — 65270000029 HC RM PRIVATE

## 2018-04-05 PROCEDURE — 74011250637 HC RX REV CODE- 250/637: Performed by: FAMILY MEDICINE

## 2018-04-05 PROCEDURE — 36415 COLL VENOUS BLD VENIPUNCTURE: CPT | Performed by: FAMILY MEDICINE

## 2018-04-05 PROCEDURE — 80048 BASIC METABOLIC PNL TOTAL CA: CPT | Performed by: FAMILY MEDICINE

## 2018-04-05 PROCEDURE — 85025 COMPLETE CBC W/AUTO DIFF WBC: CPT | Performed by: FAMILY MEDICINE

## 2018-04-05 RX ORDER — BACLOFEN 10 MG/1
20 TABLET ORAL
Status: DISCONTINUED | OUTPATIENT
Start: 2018-04-05 | End: 2018-04-06 | Stop reason: HOSPADM

## 2018-04-05 RX ORDER — ZOLPIDEM TARTRATE 5 MG/1
5 TABLET ORAL
Status: DISCONTINUED | OUTPATIENT
Start: 2018-04-05 | End: 2018-04-06 | Stop reason: HOSPADM

## 2018-04-05 RX ADMIN — ZOLPIDEM TARTRATE 5 MG: 5 TABLET ORAL at 22:48

## 2018-04-05 RX ADMIN — BACLOFEN 20 MG: 10 TABLET ORAL at 17:53

## 2018-04-05 RX ADMIN — OXYCODONE HYDROCHLORIDE AND ACETAMINOPHEN 1 TABLET: 5; 325 TABLET ORAL at 21:26

## 2018-04-05 RX ADMIN — OXYCODONE HYDROCHLORIDE AND ACETAMINOPHEN 1 TABLET: 5; 325 TABLET ORAL at 01:47

## 2018-04-05 RX ADMIN — OXYCODONE HYDROCHLORIDE AND ACETAMINOPHEN 1 TABLET: 5; 325 TABLET ORAL at 05:33

## 2018-04-05 RX ADMIN — BACLOFEN 20 MG: 10 TABLET ORAL at 11:49

## 2018-04-05 RX ADMIN — TAMSULOSIN HYDROCHLORIDE 0.4 MG: 0.4 CAPSULE ORAL at 17:53

## 2018-04-05 RX ADMIN — OXYCODONE HYDROCHLORIDE AND ACETAMINOPHEN 1 TABLET: 5; 325 TABLET ORAL at 17:53

## 2018-04-05 RX ADMIN — ENOXAPARIN SODIUM 40 MG: 40 INJECTION SUBCUTANEOUS at 09:34

## 2018-04-05 RX ADMIN — BACLOFEN 20 MG: 10 TABLET ORAL at 21:26

## 2018-04-05 RX ADMIN — OXYCODONE HYDROCHLORIDE AND ACETAMINOPHEN 1 TABLET: 5; 325 TABLET ORAL at 11:50

## 2018-04-05 RX ADMIN — BACLOFEN 20 MG: 10 TABLET ORAL at 01:47

## 2018-04-05 RX ADMIN — LEVOFLOXACIN 750 MG: 5 INJECTION, SOLUTION INTRAVENOUS at 01:47

## 2018-04-05 NOTE — PROGRESS NOTES
EARL NOTE: EARL met with the pt to confirm information. [facility]. EARL called and spoke with [facility] staff member Mina Barr who reported they are at this time. Mina Barr requested medical updates. EARL will request the attending to write an order [facility]. EARL will provide information through [place]. Pt reported receiving personal care aid from [facility] ([phone]). EARL spoke with staff at [facility] and they confirmed 7 days a week providing services. They requested a call when pt is d/c so they can resume care. Pt will need medical transport at discharge. He does not have his wheelchair at the hospital.     Care Management Interventions  PCP Verified by CM: Yes Kylah Louis NP last seen less than 2 months ago )  Palliative Care Criteria Met (RRAT>21 & CHF Dx)?: No  Physical Therapy Consult: No  Occupational Therapy Consult: No  Speech Therapy Consult: No  Current Support Network: Lives Alone  Confirm Follow Up Transport: Other (see comment) (Medical transport will need to be arranged)  Discharge Location  Discharge Placement: Home    SW will monitor and assist with d/c planning.     DAGOBERTO Mckeon LSW   [phone] (pager)

## 2018-04-05 NOTE — PROGRESS NOTES
conducted an initial consultation and Spiritual Assessment for Jean Cespedes, who is a 39 y.o.,male. Patients Primary Language is: [place]. According to the patients EMR Temple Affiliation is: No Taoism. The reason the Patient came to the hospital is:   Patient Active Problem List    Diagnosis Date Noted    CAP (community acquired pneumonia) 04/04/2018    Neurogenic bladder     Infertility male     UTI (urinary tract infection)         The  provided the following Interventions:  Initiated a relationship of care and support. Explored issues of sara, belief, spirituality and Restorationist/ritual needs while hospitalized. Listened empathically to patient who was feel badly and wanting to sleep. Patient did acknowledge the 's presence and dialogued briefly with him. Provided information about Spiritual Care Services. Offered  assurance of continued prayers on patient's behalf. The following outcomes where achieved:  Patient shared limited information about both their medical narrative and spiritual journey/beliefs.  confirmed Patient's Temple Affiliation. Patient processed feeling about current hospitalization. Patient expressed gratitude for 's visit. Assessment:  Patient does not have any Restorationist/cultural needs that will affect patients preferences in health care. There are no spiritual or Restorationist issues which require intervention at this time. Plan:  Chaplains will continue to follow and will provide pastoral care on an as needed/requested basis.  recommends bedside caregivers page  on duty if patient shows signs of acute spiritual or emotional distress.       Maria R Coppola, [address] [place] [address]  Spiritual Care  [phone]
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
## 2018-04-05 NOTE — PROGRESS NOTES
Problem: Falls - Risk of  Goal: *Absence of Falls  Document Caprice Fall Risk and appropriate interventions in the flowsheet.    Outcome: Progressing Towards Goal  Fall Risk Interventions:            Medication Interventions: Bed/chair exit alarm    Elimination Interventions: Call light in reach, Toileting schedule/hourly rounds

## 2018-04-05 NOTE — PROGRESS NOTES
EARL DISCHARGE NOTE: Pt is being discharged home. [address] will arrive at [facility]. Danny Spenceangella [address] contacted [address] for [facility]. [address] will contact [facility] with authorization. PCS form is completed and on the pt's chart. Care Management Interventions  PCP Verified by CM: Yes Brannon Foley, RUPERTO last seen less than 2 months ago )  Palliative Care Criteria Met (RRAT>21 & CHF Dx)?: No  Physical Therapy Consult: No  Occupational Therapy Consult: No  Speech Therapy Consult: No  Current Support Network: Lives Alone  Confirm Follow Up Transport: Other (see comment) (Medical transport will need to be arranged)  Discharge Location  Discharge Placement: [address] [place] [address], [facility] LSW  [phone] (pager)      EARL notified that discharge was not occurring today. [facility] and placed the pt on the will call list for jose ramon.  [facility] called [facility] and cancelled the request.     Carlos Mcneil, [facility] LSW  [phone] (pager)

## 2018-04-06 VITALS
HEART RATE: 71 BPM | OXYGEN SATURATION: 93 % | SYSTOLIC BLOOD PRESSURE: 88 MMHG | BODY MASS INDEX: 22.26 KG/M2 | RESPIRATION RATE: 18 BRPM | TEMPERATURE: 98 F | DIASTOLIC BLOOD PRESSURE: 54 MMHG | HEIGHT: 68 IN | WEIGHT: 146.9 LBS

## 2018-04-06 LAB
ANION GAP SERPL CALC-SCNC: 8 MMOL/L (ref 3–18)
BASOPHILS # BLD: 0 K/UL (ref 0–0.1)
BASOPHILS NFR BLD: 0 % (ref 0–2)
BUN SERPL-MCNC: 7 MG/DL (ref 7–18)
BUN/CREAT SERPL: 10 (ref 12–20)
CALCIUM SERPL-MCNC: 7.9 MG/DL (ref 8.5–10.1)
CHLORIDE SERPL-SCNC: 101 MMOL/L (ref 100–108)
CO2 SERPL-SCNC: 27 MMOL/L (ref 21–32)
CREAT SERPL-MCNC: 0.68 MG/DL (ref 0.6–1.3)
DIFFERENTIAL METHOD BLD: ABNORMAL
EOSINOPHIL # BLD: 0 K/UL (ref 0–0.4)
EOSINOPHIL NFR BLD: 0 % (ref 0–5)
ERYTHROCYTE [DISTWIDTH] IN BLOOD BY AUTOMATED COUNT: 12.5 % (ref 11.6–14.5)
GLUCOSE SERPL-MCNC: 113 MG/DL (ref 74–99)
HCT VFR BLD AUTO: 36.5 % (ref 36–48)
HGB BLD-MCNC: 12.1 G/DL (ref 13–16)
LYMPHOCYTES # BLD: 1 K/UL (ref 0.9–3.6)
LYMPHOCYTES NFR BLD: 40 % (ref 21–52)
MCH RBC QN AUTO: 30.7 PG (ref 24–34)
MCHC RBC AUTO-ENTMCNC: 33.2 G/DL (ref 31–37)
MCV RBC AUTO: 92.6 FL (ref 74–97)
MONOCYTES # BLD: 0.1 K/UL (ref 0.05–1.2)
MONOCYTES NFR BLD: 4 % (ref 3–10)
NEUTS SEG # BLD: 1.4 K/UL (ref 1.8–8)
NEUTS SEG NFR BLD: 56 % (ref 40–73)
PLATELET # BLD AUTO: 155 K/UL (ref 135–420)
PMV BLD AUTO: 10.6 FL (ref 9.2–11.8)
POTASSIUM SERPL-SCNC: 3.1 MMOL/L (ref 3.5–5.5)
RBC # BLD AUTO: 3.94 M/UL (ref 4.7–5.5)
SODIUM SERPL-SCNC: 136 MMOL/L (ref 136–145)
WBC # BLD AUTO: 2.5 K/UL (ref 4.6–13.2)

## 2018-04-06 PROCEDURE — 77030011256 HC DRSG MEPILEX <16IN NO BORD MOLN -A

## 2018-04-06 PROCEDURE — 74011250636 HC RX REV CODE- 250/636: Performed by: FAMILY MEDICINE

## 2018-04-06 PROCEDURE — 74011250637 HC RX REV CODE- 250/637: Performed by: FAMILY MEDICINE

## 2018-04-06 PROCEDURE — 80048 BASIC METABOLIC PNL TOTAL CA: CPT | Performed by: FAMILY MEDICINE

## 2018-04-06 PROCEDURE — 36415 COLL VENOUS BLD VENIPUNCTURE: CPT | Performed by: FAMILY MEDICINE

## 2018-04-06 PROCEDURE — 85025 COMPLETE CBC W/AUTO DIFF WBC: CPT | Performed by: FAMILY MEDICINE

## 2018-04-06 RX ORDER — LEVOFLOXACIN 750 MG/1
750 TABLET ORAL DAILY
Qty: 7 TAB | Refills: 0 | Status: SHIPPED | OUTPATIENT
Start: 2018-04-06 | End: 2018-04-13

## 2018-04-06 RX ORDER — OXYCODONE AND ACETAMINOPHEN 5; 325 MG/1; MG/1
1 TABLET ORAL
Qty: 12 TAB | Refills: 0 | Status: SHIPPED | OUTPATIENT
Start: 2018-04-06 | End: 2020-05-14

## 2018-04-06 RX ORDER — POTASSIUM CHLORIDE 20 MEQ/1
40 TABLET, EXTENDED RELEASE ORAL ONCE
Status: COMPLETED | OUTPATIENT
Start: 2018-04-06 | End: 2018-04-06

## 2018-04-06 RX ADMIN — LEVOFLOXACIN 750 MG: 5 INJECTION, SOLUTION INTRAVENOUS at 01:12

## 2018-04-06 RX ADMIN — BACLOFEN 20 MG: 10 TABLET ORAL at 01:12

## 2018-04-06 RX ADMIN — ENOXAPARIN SODIUM 40 MG: 40 INJECTION SUBCUTANEOUS at 09:40

## 2018-04-06 RX ADMIN — BACLOFEN 20 MG: 10 TABLET ORAL at 09:41

## 2018-04-06 RX ADMIN — OXYCODONE HYDROCHLORIDE AND ACETAMINOPHEN 1 TABLET: 5; 325 TABLET ORAL at 01:12

## 2018-04-06 RX ADMIN — BACLOFEN 20 MG: 10 TABLET ORAL at 05:33

## 2018-04-06 RX ADMIN — OXYCODONE HYDROCHLORIDE AND ACETAMINOPHEN 1 TABLET: 5; 325 TABLET ORAL at 05:33

## 2018-04-06 RX ADMIN — OXYCODONE HYDROCHLORIDE AND ACETAMINOPHEN 1 TABLET: 5; 325 TABLET ORAL at 09:41

## 2018-04-06 RX ADMIN — POTASSIUM CHLORIDE 40 MEQ: 20 TABLET, EXTENDED RELEASE ORAL at 09:41

## 2018-04-06 NOTE — PROGRESS NOTES
SW DISCHARGE ADDENDUM:     [facility] medical transport will arrive at 10:30 am. Pt's [facility] agency [facility] was notified, as well as, [address] [facility] for personal care.     DAGOBERTO Goldberg  [phone] (pager)
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
## 2018-04-06 NOTE — PROGRESS NOTES
DISCHARGE SUMMARY from Nurse    PATIENT INSTRUCTIONS:    AThese are general instructions for a healthy lifestyle:    No smoking/ No tobacco products/ Avoid exposure to second hand smoke  Surgeon General's Warning:  Quitting smoking now greatly reduces serious risk to your health. Obesity, smoking, and sedentary lifestyle greatly increases your risk for illness    A healthy diet, regular physical exercise & weight monitoring are important for maintaining a healthy lifestyle    You may be retaining fluid if you have a history of heart failure or if you experience any of the following symptoms:  Weight gain of 3 pounds or more overnight or 5 pounds in a week, increased swelling in our hands or feet or shortness of breath while lying flat in bed. Please call your doctor as soon as you notice any of these symptoms; do not wait until your next office visit. Recognize signs and symptoms of STROKE:    F-face looks uneven    A-arms unable to move or move unevenly    S-speech slurred or non-existent    T-time-call 911 as soon as signs and symptoms begin-DO NOT go       Back to bed or wait to see if you get better-TIME IS BRAIN. Warning Signs of HEART ATTACK     Call 911 if you have these symptoms:   Chest discomfort. Most heart attacks involve discomfort in the center of the chest that lasts more than a few minutes, or that goes away and comes back. It can feel like uncomfortable pressure, squeezing, fullness, or pain.  Discomfort in other areas of the upper body. Symptoms can include pain or discomfort in one or both arms, the back, neck, jaw, or stomach.  Shortness of breath with or without chest discomfort.  Other signs may include breaking out in a cold sweat, nausea, or lightheadedness. Don't wait more than five minutes to call 911 - MINUTES MATTER! Fast action can save your life. Calling 911 is almost always the fastest way to get lifesaving treatment.  Emergency Medical Services staff can begin treatment when they arrive -- up to an hour sooner than if someone gets to the hospital by car. The discharge information has been reviewed with the patient. The patient verbalized understanding. Discharge medications reviewed with the patient and appropriate educational materials and side effects teaching were provided.   ___________________________________________________________________________________________________________________________________

## 2018-04-06 NOTE — PROGRESS NOTES
1718 - Paged Dr. Latia Fitzpatrick to inform of BP, low earlier but right after given pain medication and Ambien (pt asymptomatic). BP still on the lower side, MAP >60. Taking on leg per pt request, and pt is a paraplegic. Pt denies any dizziness, lightheaded, CP or any difference in how he normally feels. Pt complaint is that he didn't sleep well last and not sure why, despite getting pain medication and baclofen every 4 hrs and 5 mg of Ambien qhs.    8800 - another nurse on floor paged MD, still no call back. Will advise oncoming nurse. 4219 - Dr. Latia Fitzpatrick called back, informed of BP. No new orders.

## 2018-04-06 NOTE — PROGRESS NOTES
EARL DISCHARGE NOTE: Pt is being discharged today. EARL has contacted [facility] and requested a 10 AM pick-up time. [address] will obtain authorization and contact EARL with confirmation. Care Management Interventions  PCP Verified by CM: Yes Derrick Hedrick NP last seen less than 2 months ago )  Palliative Care Criteria Met (RRAT>21 & CHF Dx)?: No  Mode of Transport at Discharge:  Other (see comment) (Medical Transport)  Hospital Transport Time of Discharge: 1000  Physical Therapy Consult: No  Occupational Therapy Consult: No  Speech Therapy Consult: No  Current Support Network: Lives Alone  Confirm Follow Up Transport: Other (see comment) (Medical Transport)  Discharge Location  Discharge Placement: Home with home health    DAGOBERTO Davenport LSW  [phone] (pager)

## 2018-04-06 NOTE — DISCHARGE INSTRUCTIONS

## 2018-04-09 LAB
BACTERIA SPEC CULT: NORMAL
BACTERIA SPEC CULT: NORMAL
SERVICE CMNT-IMP: NORMAL
SERVICE CMNT-IMP: NORMAL

## 2018-12-26 ENCOUNTER — HOSPITAL ENCOUNTER (OUTPATIENT)
Dept: ULTRASOUND IMAGING | Age: 43
Discharge: HOME | End: 2018-12-26
Attending: UROLOGY
Payer: MEDICARE

## 2018-12-26 DIAGNOSIS — N39.0 URINARY TRACT INFECTION WITHOUT HEMATURIA, SITE UNSPECIFIED: ICD-10-CM

## 2018-12-26 PROCEDURE — 76770 US EXAM ABDO BACK WALL COMP: CPT

## 2022-03-16 ENCOUNTER — HOSPITAL ENCOUNTER (OUTPATIENT)
Dept: WOUND CARE | Age: 47
Discharge: HOME | End: 2022-03-16
Attending: HOSPITALIST
Payer: MEDICARE

## 2022-03-16 VITALS
HEART RATE: 59 BPM | DIASTOLIC BLOOD PRESSURE: 113 MMHG | TEMPERATURE: 98.4 F | OXYGEN SATURATION: 96 % | SYSTOLIC BLOOD PRESSURE: 148 MMHG | RESPIRATION RATE: 18 BRPM

## 2022-03-16 PROCEDURE — 11043 DBRDMT MUSC&/FSCA 1ST 20/<: CPT

## 2022-03-19 PROBLEM — J18.9 CAP (COMMUNITY ACQUIRED PNEUMONIA): Status: ACTIVE | Noted: 2018-04-04

## 2022-03-31 NOTE — WOUND CARE
03/16/22 1407   Wound Ischial Right   Date First Assessed/Time First Assessed: 03/16/22 1406   Present on Hospital Admission: Yes  Primary Wound Type: Pressure Injury  Location: Ischial  Wound Location Orientation: Right   Wound Image     Wound Etiology Pressure Stage 3   Dressing Status Intact   Cleansed Wound cleanser   Dressing/Treatment Collagen;Hydrofera Blue; Foam   Dressing Change Due 03/23/22   Wound Length (cm) 3 cm   Wound Width (cm) 2 cm   Wound Depth (cm) 1 cm   Wound Surface Area (cm^2) 6 cm^2   Wound Volume (cm^3) 6 cm^3   Post-Procedure Length (cm) 3.1 cm   Post-Procedure Width (cm) 2.1 cm   Post-Procedure Depth (cm) 1.1 cm   Post-Procedure Surface Area (cm^2) 6.51 cm^2   Post-Procedure Volume (cm^3) 7.161 cm^3   Undermining Starts ___ O'Clock 9 o'clock   Undermining Ends ___ O'Clock 1 o'clock   Undermining Maximum Distance (cm) 5 cm   Wound Assessment Granulation tissue;Pale granulation tissue   Drainage Amount Moderate   Drainage Description Serosanguinous   Wound Odor Mild   Mandy-Wound/Incision Assessment Maceration   Edges Epibole (rolled edges)   Wound Thickness Description Full thickness

## 2022-04-07 ENCOUNTER — HOSPITAL ENCOUNTER (OUTPATIENT)
Dept: WOUND CARE | Age: 47
Discharge: HOME | End: 2022-04-07
Attending: HOSPITALIST
Payer: MEDICARE

## 2022-04-07 VITALS — RESPIRATION RATE: 18 BRPM

## 2022-04-07 DIAGNOSIS — L89.44 PRESSURE ULCER OF CONTIGUOUS REGION INVOLVING BACK AND RIGHT BUTTOCK, STAGE 4 (HCC): Primary | ICD-10-CM

## 2022-04-07 PROCEDURE — 11042 DBRDMT SUBQ TIS 1ST 20SQCM/<: CPT

## 2022-04-07 RX ORDER — CLOBETASOL PROPIONATE 0.5 MG/G
OINTMENT TOPICAL ONCE
Status: CANCELLED | OUTPATIENT
Start: 2022-04-07 | End: 2022-04-07

## 2022-04-07 RX ORDER — MUPIROCIN 20 MG/G
OINTMENT TOPICAL ONCE
Status: CANCELLED | OUTPATIENT
Start: 2022-04-07 | End: 2022-04-07

## 2022-04-07 RX ORDER — SILVER SULFADIAZINE 10 G/1000G
CREAM TOPICAL ONCE
Status: CANCELLED | OUTPATIENT
Start: 2022-04-07 | End: 2022-04-07

## 2022-04-07 RX ORDER — LIDOCAINE HYDROCHLORIDE 20 MG/ML
JELLY TOPICAL ONCE
Status: CANCELLED | OUTPATIENT
Start: 2022-04-07 | End: 2022-04-07

## 2022-04-07 RX ORDER — LIDOCAINE 50 MG/G
OINTMENT TOPICAL ONCE
Status: CANCELLED | OUTPATIENT
Start: 2022-04-07 | End: 2022-04-07

## 2022-04-07 RX ORDER — LIDOCAINE HYDROCHLORIDE 40 MG/ML
SOLUTION TOPICAL ONCE
Status: CANCELLED | OUTPATIENT
Start: 2022-04-07 | End: 2022-04-07

## 2022-04-07 RX ORDER — LIDOCAINE HYDROCHLORIDE 20 MG/ML
JELLY TOPICAL ONCE
Status: COMPLETED | OUTPATIENT
Start: 2022-04-07 | End: 2022-04-07

## 2022-04-07 RX ORDER — LIDOCAINE 40 MG/G
CREAM TOPICAL ONCE
Status: CANCELLED | OUTPATIENT
Start: 2022-04-07 | End: 2022-04-07

## 2022-04-07 RX ORDER — BACITRACIN ZINC AND POLYMYXIN B SULFATE 500; 1000 [USP'U]/G; [USP'U]/G
OINTMENT TOPICAL ONCE
Status: CANCELLED | OUTPATIENT
Start: 2022-04-07 | End: 2022-04-07

## 2022-04-07 RX ADMIN — LIDOCAINE HYDROCHLORIDE: 20 JELLY TOPICAL at 14:30

## 2022-04-07 NOTE — WOUND CARE
04/07/22 1517   Wound Ischial Right   Date First Assessed/Time First Assessed: 03/16/22 1406   Present on Hospital Admission: Yes  Primary Wound Type: Pressure Injury  Location: Ischial  Wound Location Orientation: Right   Wound Image    Wound Etiology Pressure Stage 3   Dressing Status Intact;Breakthrough drainage noted   Cleansed Wound cleanser   Dressing/Treatment Alginate;Collagen with Ag;Gauze dressing/dressing sponge;Silicone border;Tape/Soft cloth adhesive tape   Dressing Change Due 04/13/22   Wound Length (cm) 2 cm   Wound Width (cm) 2.5 cm   Wound Depth (cm) 0.6 cm   Wound Surface Area (cm^2) 5 cm^2   Change in Wound Size % 16.67   Wound Volume (cm^3) 3 cm^3   Wound Healing % 50   Post-Procedure Length (cm) 2.1 cm   Post-Procedure Width (cm) 2.6 cm   Post-Procedure Depth (cm) 0.8 cm   Post-Procedure Surface Area (cm^2) 5.46 cm^2   Post-Procedure Volume (cm^3) 4.368 cm^3   Undermining Starts ___ O'Clock 12 o'clock   Undermining Ends ___ O'Clock 2 o'clock   Undermining Maximum Distance (cm) 4.2 cm   Wound Assessment Pink/red   Drainage Amount Moderate   Drainage Description Serosanguinous   Wound Odor Mild   Mandy-Wound/Incision Assessment Maceration   Edges Epibole (rolled edges)   Wound Thickness Description Full thickness

## 2022-04-09 PROBLEM — G82.50 QUADRIPLEGIA (HCC): Status: ACTIVE | Noted: 2022-04-09
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
## 2022-04-11 PROBLEM — L89.44 PRESSURE ULCER OF CONTIGUOUS REGION INVOLVING BACK AND RIGHT BUTTOCK, STAGE 4 (HCC): Status: ACTIVE | Noted: 2022-04-07

## 2022-04-11 PROBLEM — G82.50 QUADRIPLEGIA (HCC): Status: ACTIVE | Noted: 2022-04-09

## 2022-04-13 ENCOUNTER — HOSPITAL ENCOUNTER (OUTPATIENT)
Dept: WOUND CARE | Age: 47
Discharge: HOME | End: 2022-04-13
Attending: HOSPITALIST
Payer: MEDICARE

## 2022-04-13 DIAGNOSIS — L89.44 PRESSURE ULCER OF CONTIGUOUS REGION INVOLVING BACK AND RIGHT BUTTOCK, STAGE 4 (HCC): Primary | ICD-10-CM

## 2022-04-13 PROCEDURE — 11042 DBRDMT SUBQ TIS 1ST 20SQCM/<: CPT

## 2022-04-13 RX ADMIN — LIDOCAINE HYDROCHLORIDE: 20 JELLY TOPICAL at 15:00

## 2022-04-14 VITALS
RESPIRATION RATE: 18 BRPM | HEART RATE: 80 BPM | TEMPERATURE: 98.4 F | SYSTOLIC BLOOD PRESSURE: 127 MMHG | DIASTOLIC BLOOD PRESSURE: 71 MMHG | OXYGEN SATURATION: 94 %

## 2022-04-14 RX ORDER — MUPIROCIN 20 MG/G
OINTMENT TOPICAL ONCE
OUTPATIENT
Start: 2022-04-14 | End: 2022-04-14

## 2022-04-14 RX ORDER — LIDOCAINE HYDROCHLORIDE 20 MG/ML
JELLY TOPICAL ONCE
Status: COMPLETED | OUTPATIENT
Start: 2022-04-14 | End: 2022-04-13

## 2022-04-14 RX ORDER — BACITRACIN ZINC AND POLYMYXIN B SULFATE 500; 1000 [USP'U]/G; [USP'U]/G
OINTMENT TOPICAL ONCE
OUTPATIENT
Start: 2022-04-14 | End: 2022-04-14

## 2022-04-14 RX ORDER — LIDOCAINE HYDROCHLORIDE 20 MG/ML
JELLY TOPICAL ONCE
Status: CANCELLED | OUTPATIENT
Start: 2022-04-14 | End: 2022-04-14

## 2022-04-14 RX ORDER — LIDOCAINE 50 MG/G
OINTMENT TOPICAL ONCE
OUTPATIENT
Start: 2022-04-14 | End: 2022-04-14

## 2022-04-14 RX ORDER — CLOBETASOL PROPIONATE 0.5 MG/G
OINTMENT TOPICAL ONCE
OUTPATIENT
Start: 2022-04-14 | End: 2022-04-14

## 2022-04-14 RX ORDER — LIDOCAINE HYDROCHLORIDE 40 MG/ML
SOLUTION TOPICAL ONCE
OUTPATIENT
Start: 2022-04-14 | End: 2022-04-14

## 2022-04-14 RX ORDER — LIDOCAINE HYDROCHLORIDE 20 MG/ML
JELLY TOPICAL ONCE
OUTPATIENT
Start: 2022-04-14 | End: 2022-04-14

## 2022-04-14 RX ORDER — SILVER SULFADIAZINE 10 G/1000G
CREAM TOPICAL ONCE
OUTPATIENT
Start: 2022-04-14 | End: 2022-04-14

## 2022-04-14 RX ORDER — LIDOCAINE 40 MG/G
CREAM TOPICAL ONCE
OUTPATIENT
Start: 2022-04-14 | End: 2022-04-14

## 2022-04-14 NOTE — DISCHARGE INSTRUCTIONS
Patient Education        Pressure Injuries: Care Instructions  Your Care Instructions     A pressure injury on the skin is caused by constant pressure to that area. These injuries--also called decubitus ulcers or bedsores--may happen when you lie in bed or sit in a wheelchair for a long time. The constant pressure blocks the blood supply to the skin. This causes skin cells to die and creates a sore. Pressure injuries usually occur over bony areas, such as the hips, lower back, elbows, heels, and shoulders. They also can occur in places where the skin folds over on itself. You may have mild redness or open sores that are harder to heal.  Good care at home can help heal pressure injuries. You or your caregiver needs to check your skin every day for sores. You need good nutrition and plenty of fluids to keep your skin healthy and prevent new pressure injuries. Follow-up care is a key part of your treatment and safety. Be sure to make and go to all appointments, and call your doctor if you are having problems. It's also a good idea to know your test results and keep a list of the medicines you take. How can you care for yourself at home? · If your doctor prescribed a medicated ointment or cream, use it exactly as prescribed. Call your doctor if you think you are having a problem with your medicine. · Wash pressure injuries every day, or as often as your doctor recommends. Most tap water is safe, but follow the advice of your doctor or nurse. He or she may recommend that you use a saline solution. This is a salt and water solution that you can buy over the counter. · Put on bandages as your doctor or wound care specialist says. · Keep healthy tissue around the sore clean and dry. · Check your skin every day for sores (or have a caregiver do it). · If you know what is causing the pressure that caused the sore, find a way to remove that pressure.   To prevent pressure injuries  · Change your position or have your caregiver help you change your position often. You may need to do this every 2 hours if you are in bed or every 15 minutes if you are in a wheelchair. This lowers the chance of making sores worse and getting new sores. · Use special mattresses or other support. These may include low-pressure mattresses or cushions made of foam that can be filled with air, water, beads, or fiber. · Eat healthy foods with plenty of protein to help heal damaged skin and to help new skin grow. · Try to stay at a healthy weight. Being overweight can lead to more pressure on your skin. · Do not slide across sheets or slump in a chair or bed. · Do not smoke. Smoking dries the skin and reduces its blood supply. If you need help quitting, talk to your doctor about stop-smoking programs and medicines. These can increase your chances of quitting for good. When should you call for help? Call your doctor now or seek immediate medical care if:    · You have signs of infection, such as:  ? Increased pain, swelling, warmth, or redness. ? Red streaks leading from the sore. ? Pus draining from the sore. ? A fever. Watch closely for changes in your health, and be sure to contact your doctor if:    · Your pressure injuries are not healing.     · You have new pressure injuries.     · You need help changing positions in bed or in a chair.     · Your caregiver needs help to move you. Where can you learn more? Go to http://www.gray.com/  Enter F114 in the search box to learn more about \"Pressure Injuries: Care Instructions. \"  Current as of: July 6, 2021               Content Version: 13.2  © 2781-5543 [employer]. Care instructions adapted under license by [employer] (which disclaims liability or warranty for this information).  If you have questions about a medical condition or this instruction, always ask your healthcare professional. Esvin Armijo disclaims any warranty or liability for your use of this information.

## 2022-04-14 NOTE — WOUND CARE
[facility]:     [facility] [address]  [facility] [phone]   FAX NUMBER [phone]    Patient Information:      Javier Grissom [place] [address] [address] [place]    [phone]  : 1975  AGE: 49 y.o. GENDER: male   TODAYS DATE:  2022    Insurance:      PRIMARY INSURANCE:  [id number] - (Medicare)    Payor/Plan Subscr  Sex Relation Sub. Ins. ID Effective Group Num   1. [id number] 1975 Male Self [id number]                                    [address] [address]   2.  [employer] [employer] [employer] 1975 Male Self [id number]                                    [address] [address]       Patient Wound Information:      Problem List Items Addressed This Visit        Other    Pressure ulcer of contiguous region involving back and right buttock, stage 4 (HCC) - Primary    Relevant Orders    INITIATE OUTPATIENT WOUND CARE PROTOCOL          WOUNDS REQUIRING DRESSING SUPPLIES:     Wound Ischial Right (Active)   Wound Image    22 1505   Wound Etiology Pressure Stage 3 22 1505   Dressing Status Breakthrough drainage noted 22 1505   Cleansed Wound cleanser 22 1505   Dressing/Treatment Foam;Collagen;Alginate 22 1505   Dressing Change Due 22 1505   Wound Length (cm) 2.5 cm 22 1505   Wound Width (cm) 2.5 cm 22 1505   Wound Depth (cm) 0.6 cm 22 1505   Wound Surface Area (cm^2) 6.25 cm^2 22 1505   Change in Wound Size % -4.17 22 1505   Wound Volume (cm^3) 3.75 cm^3 22 1505   Wound Healing % 38 22 1505   Post-Procedure Length (cm) 2.1 cm 22 1517   Post-Procedure Width (cm) 2.6 cm 22 1517   Post-Procedure Depth (cm) 0.8 cm 22 1517   Post-Procedure Surface Area (cm^2) 5.46 cm^2 22 1517   Post-Procedure Volume (cm^3) 4.368 cm^3 22 1517   Distance Tunneling (cm) 5 cm 04/13/22 1505   Direction of Tunnel 1 o'clock 04/13/22 1505   Undermining Starts ___ O'Clock 11 o'clock 04/13/22 1505   Undermining Ends ___ O'Clock 1 o'clock 04/13/22 1505   Undermining Maximum Distance (cm) 3 cm 04/13/22 1505   Wound Assessment [facility] 04/13/22 1505   Drainage Amount Moderate 04/13/22 1505   Drainage Description Serosanguinous 04/13/22 1505   Wound Odor Mild 04/13/22 1505   Mandy-Wound/Incision Assessment Hyperkeratosis (Callous); Maceration 04/13/22 1505   Edges Unattached edges;Epibole (rolled edges) 04/13/22 1505   Wound Thickness Description Full thickness 04/13/22 1505   Number of days: 29        Supplies Requested :      WOUND #:1   PRIMARY DRESSING:  Collagen    Other silver alginate rope, mepilex border      FREQUENCY OF DRESSING CHANGES:  Every other day       ADDITIONAL ITEMS:  [] Gloves Small  [x] Gloves Medium [] Gloves Large [] Gloves XLarge  [] Tape 1\" [x] Tape 2\" [] Tape 3\"  [] Medipore Tape  [x] Saline  [] Skin Prep   [] Adhesive Remover   [x] Cotton Tip Applicators   [x] Other: Wound cleanser if covered by insurance     Patient Wound(s) Debrided: [x] Yes if yes please add date 4/13/2022   [] No    Debribement Type: Excisional/Sharp    Patient currently being seen by Home Health: [] Yes   [x] No    Duration for needed supplies:  []15  [x]30  []60  []90 Days    Electronically signed by Ashleigh Christina RN on 4/14/2022 at 11:06 AM    Provider Information:      PROVIDER'S NAME: Dr. Butch Garcia    NPI: [id number]

## 2022-04-14 NOTE — WOUND CARE
04/13/22 1505   Wound Ischial Right   Date First Assessed/Time First Assessed: 03/16/22 1406   Present on Hospital Admission: Yes  Primary Wound Type: Pressure Injury  Location: Ischial  Wound Location Orientation: Right   Wound Image     Wound Etiology Pressure Stage 3   Dressing Status Breakthrough drainage noted   Cleansed Wound cleanser   Dressing/Treatment Foam;Collagen;Alginate   Dressing Change Due 04/20/22   Wound Length (cm) 2.5 cm   Wound Width (cm) 2.5 cm   Wound Depth (cm) 0.6 cm   Wound Surface Area (cm^2) 6.25 cm^2   Change in Wound Size % -4.17   Wound Volume (cm^3) 3.75 cm^3   Wound Healing % 38   Distance Tunneling (cm) 5 cm   Direction of Tunnel 1 o'clock   Undermining Starts ___ O'Clock 11 o'clock   Undermining Ends ___ O'Clock 1 o'clock   Undermining Maximum Distance (cm) 3 cm   Wound Assessment Slough   Drainage Amount Moderate   Drainage Description Serosanguinous   Wound Odor Mild   Mandy-Wound/Incision Assessment Hyperkeratosis (Callous); Maceration   Edges Unattached edges;Epibole (rolled edges)   Wound Thickness Description Full thickness

## 2022-04-27 ENCOUNTER — APPOINTMENT (OUTPATIENT)
Dept: WOUND CARE | Age: 47
End: 2022-04-27
Attending: HOSPITALIST

## 2022-05-05 ENCOUNTER — HOSPITAL ENCOUNTER (OUTPATIENT)
Dept: WOUND CARE | Age: 47
Discharge: HOME | End: 2022-05-05
Attending: HOSPITALIST
Payer: MEDICARE

## 2022-05-05 VITALS
OXYGEN SATURATION: 99 % | HEART RATE: 81 BPM | RESPIRATION RATE: 16 BRPM | DIASTOLIC BLOOD PRESSURE: 51 MMHG | SYSTOLIC BLOOD PRESSURE: 91 MMHG | TEMPERATURE: 98 F

## 2022-05-05 DIAGNOSIS — L89.44 PRESSURE ULCER OF CONTIGUOUS REGION INVOLVING BACK AND RIGHT BUTTOCK, STAGE 4 (HCC): Primary | ICD-10-CM

## 2022-05-05 PROCEDURE — 11042 DBRDMT SUBQ TIS 1ST 20SQCM/<: CPT

## 2022-05-05 RX ORDER — LIDOCAINE 50 MG/G
OINTMENT TOPICAL ONCE
OUTPATIENT
Start: 2022-05-05 | End: 2022-05-05

## 2022-05-05 RX ORDER — LIDOCAINE HYDROCHLORIDE 40 MG/ML
SOLUTION TOPICAL ONCE
OUTPATIENT
Start: 2022-05-05 | End: 2022-05-05

## 2022-05-05 RX ORDER — LIDOCAINE HYDROCHLORIDE 20 MG/ML
JELLY TOPICAL ONCE
Status: COMPLETED | OUTPATIENT
Start: 2022-05-05 | End: 2022-05-05

## 2022-05-05 RX ORDER — CLOBETASOL PROPIONATE 0.5 MG/G
OINTMENT TOPICAL ONCE
OUTPATIENT
Start: 2022-05-05 | End: 2022-05-05

## 2022-05-05 RX ORDER — BACITRACIN ZINC AND POLYMYXIN B SULFATE 500; 1000 [USP'U]/G; [USP'U]/G
OINTMENT TOPICAL ONCE
OUTPATIENT
Start: 2022-05-05 | End: 2022-05-05

## 2022-05-05 RX ORDER — LIDOCAINE HYDROCHLORIDE 20 MG/ML
JELLY TOPICAL ONCE
OUTPATIENT
Start: 2022-05-05 | End: 2022-05-05

## 2022-05-05 RX ORDER — LIDOCAINE 40 MG/G
CREAM TOPICAL ONCE
OUTPATIENT
Start: 2022-05-05 | End: 2022-05-05

## 2022-05-05 RX ORDER — SILVER SULFADIAZINE 10 G/1000G
CREAM TOPICAL ONCE
OUTPATIENT
Start: 2022-05-05 | End: 2022-05-05

## 2022-05-05 RX ORDER — MUPIROCIN 20 MG/G
OINTMENT TOPICAL ONCE
OUTPATIENT
Start: 2022-05-05 | End: 2022-05-05

## 2022-05-05 RX ADMIN — LIDOCAINE HYDROCHLORIDE 5 ML: 20 JELLY TOPICAL at 14:00

## 2022-05-06 NOTE — WOUND CARE
05/05/22 1502   Wound Ischial Right   Date First Assessed/Time First Assessed: 03/16/22 1406   Present on Hospital Admission: Yes  Primary Wound Type: Pressure Injury  Location: Ischial  Wound Location Orientation: Right   Wound Image     Wound Etiology Pressure Stage 3   Dressing Status Breakthrough drainage noted;New drainage noted;New dressing applied   Cleansed Wound cleanser   Dressing/Treatment Alginate;Alginate with Ag;Collagen with Ag;Gauze dressing/dressing sponge;Silicone border;Tape/Soft cloth adhesive tape   Dressing Change Due 05/12/22   Wound Length (cm) 3.4 cm   Wound Width (cm) 3 cm   Wound Depth (cm) 0.6 cm   Wound Surface Area (cm^2) 10.2 cm^2   Change in Wound Size % -70   Wound Volume (cm^3) 6.12 cm^3   Wound Healing % -2   Distance Tunneling (cm) 5 cm   Direction of Tunnel 12 o'clock   Undermining Starts ___ O'Clock 12 o'clock   Undermining Ends ___ O'Clock 3 o'clock   Undermining Maximum Distance (cm)   (3.1)   Wound Assessment Slough   Drainage Amount Moderate   Drainage Description Serosanguinous   Wound Odor Malodorous/Putrid   Mandy-Wound/Incision Assessment Hyperkeratosis (Callous)   Edges Epibole (rolled edges); Unattached edges   Wound Thickness Description Full thickness

## 2022-05-09 NOTE — DISCHARGE INSTRUCTIONS
Discharge Instructions for  [address]  [address], [facility]  [phone] Fax [phone]    NAME:  Naomie Moreno  YOB: 1975  MEDICAL RECORD NUMBER:  [id number]  DATE:  5/5/2022    Wound Cleansing:   Do not scrub or use excessive force. Cleanse wound prior to applying a clean dressing with:  [] Normal Saline [] Keep Wound Dry in Shower    [] Wound Cleanser   [x] Cleanse wound with Mild Soap & Water  [] May Shower at Discharge   [] Other:      Topical Treatments:  Do not apply lotions, creams, or ointments to wound bed unless directed. [] Apply moisturizing lotion to skin surrounding the wound prior to dressing change.  [] Apply antifungal ointment to skin surrounding the wound prior to dressing change. [x] Apply thin film of moisture barrier ointment to skin immediately around wound. [] Other:       Dressings:           Wound Location ***   [x] Apply Primary Dressing:       [] MediHoney Gel [x] Alginate with Silver [] Alginate   [] Collagen [x] Collagen with Silver   [] Santyl with Moisten saline gauze     [] Hydrocolloid   [] MediHoney Alginate [] Foam with Silver   [] Foam   [] Hydrofera Blue    [] Mepilex Border    [] Moisten with Saline [] Hydrogel [] Mepitel     [] Bactroban/Mupirocin [] Polysporin  [] Other:    [] Pack wound loosely with  [] Iodoform   [] Plain Packing  [] Other   [] Cover and Secure with:     [] Gauze [x] Jon [] Kerlix   [] Ace Wrap [] Cover Roll Tape [] ABD     [] Other:    Avoid contact of tape with skin.   [] Change dressing: [] Daily    [] Every Other Day [] Three times per week   [] Once a week [] Do Not Change Dressing   [] Other:     Negative Pressure:           Wound Location:   [] Pressure@           mm/Hg  []Continuous []Intermittent   [] Black  [] White Foam [] Other:   []Change dressing: []Three times per week    []Other:     Pressure Relief:  [] When sitting, shift position or do seat lifts every 15 minutes.  [] Wheelchair cushion [] Specialty Bed/Mattress  [] Turn every 2 hours when in bed. Avoid position directing pressure on wound site. Limit side lying to 30 degree tilt. Limit HOB elevation to 30 degrees. Edema Control:  Apply: [] Compression Stocking []Right Leg []Left Leg   [] Tubigrip []Right Leg Double Layer []Left Leg Double Layer       []Right Leg Single Layer []Left Leg Single Layer   [] SpandaGrip []Right Leg  []Left Leg      []Low compression 5-10 mm/Hg      []Medium compression 10-20 mm/Hg     []High compression  20-30 mm/Hg   every morning immediately when getting up should be applied to affected leg(s) from mid foot to knee making sure to cover the heel. Remove every night before going to bed. [] Elevate leg(s) above the level of the heart when sitting. [] Avoid prolonged standing in one place. [] Elevate arm/hand above the level of the heart []RightArm []LeftArm     Compression:  Apply: [] Multilayer Compression Wrap Applied in Clinic []RightLeg []Left Leg   [] Multi-layer compression. Do not get leg(s) with wrap wet. If wraps become too tight call the center or completely remove the wrap. [] Elevate leg(s) above the level of the heart when sitting. [] Avoid prolonged standing in one place. Off-Loading:   [] Off-loading when [] walking  [] in bed [] sitting  [] Total non-weight bearing  [] Right Leg  [] Left Leg   [x] Assistive Device [] Walker [] Cane  [x] Wheelchair  [] Crutches   [] Surgical shoe    [] Podus Boot(s)   [] Foam Boot(s)  [] Roll About    [] Cast Boot [] CROW Boot  [] Other:    Contact Cast:  Apply: [] Total Contact Cast Applied in Clinic []RightLeg []Left Leg   [] Do not get cast wet. Contact center or go to emergency room if there is a foul odor or becomes uncomfortable due to feeling tight or swelling. Do not use objects inside of cast to scratch.       Dietary:  [x] Diet as tolerated: [] Calorie Diabetic Diet: [] No Added Salt:  [] Increase Protein: [] Other:   Activity:  [x] Activity as tolerated:  [] Patient has no activity restrictions     [] Strict Bedrest: [] Remain off Work:     [] May return to full duty work:                                   [] Return to work with restrictions:             Return Appointment:  [x] Wound and dressing supply provider:   [] ECF or Home Healthcare:  [x] Wound Assessment: [x] Physician or NP scheduled for Wound Assessment:   [x] Return Appointment:1 [facility])  [] Ordered tests:      Electronically signed Vidya Somers LPN on 5/7/3502 at 55:93 AM     Brianna Martinez 281: Should you experience any significant changes in your wound(s) or have questions about your wound care, please contact the [address] [place] [address] at [address] [place] [address] 8:00 am - 4:30. If you need help with your wound outside these hours and cannot wait until we are again available, contact your PCP or go to the hospital emergency room. PLEASE NOTE: IF YOU ARE UNABLE TO OBTAIN WOUND SUPPLIES, CONTINUE TO USE THE SUPPLIES YOU HAVE AVAILABLE UNTIL YOU ARE ABLE TO REACH US. IT IS MOST IMPORTANT TO KEEP THE WOUND COVERED AT ALL TIMES.      Physician Signature:_______________________    Date: ___________ Time:  ____________

## 2022-05-10 PROBLEM — L89.90 PRESSURE ULCER: Status: ACTIVE | Noted: 2022-05-10
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
## 2022-08-25 ENCOUNTER — TELEPHONE (OUTPATIENT)
Dept: PHYSICAL THERAPY | Age: 47
End: 2022-08-25

## 2022-08-29 ENCOUNTER — TELEPHONE (OUTPATIENT)
Dept: PHYSICAL THERAPY | Age: 47
End: 2022-08-29

## 2022-09-13 ENCOUNTER — HOSPITAL ENCOUNTER (OUTPATIENT)
Dept: PHYSICAL THERAPY | Age: 47
Discharge: HOME | End: 2022-09-13
Payer: MEDICARE

## 2022-09-13 PROCEDURE — 97530 THERAPEUTIC ACTIVITIES: CPT

## 2022-09-13 PROCEDURE — 97162 PT EVAL MOD COMPLEX 30 MIN: CPT

## 2022-09-13 PROCEDURE — 97110 THERAPEUTIC EXERCISES: CPT
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
## 2022-09-14 ENCOUNTER — TELEPHONE (OUTPATIENT)
Dept: PHYSICAL THERAPY | Age: 47
End: 2022-09-14

## 2022-09-15 ENCOUNTER — APPOINTMENT (OUTPATIENT)
Dept: GENERAL RADIOLOGY | Age: 47
DRG: 480 | End: 2022-09-15
Attending: EMERGENCY MEDICINE
Payer: MEDICARE

## 2022-09-15 ENCOUNTER — ANESTHESIA (OUTPATIENT)
Dept: SURGERY | Age: 47
DRG: 480 | End: 2022-09-15
Payer: MEDICARE

## 2022-09-15 ENCOUNTER — APPOINTMENT (OUTPATIENT)
Dept: GENERAL RADIOLOGY | Age: 47
DRG: 480 | End: 2022-09-15
Attending: ORTHOPAEDIC SURGERY
Payer: MEDICARE

## 2022-09-15 ENCOUNTER — HOSPITAL ENCOUNTER (INPATIENT)
Age: 47
LOS: 8 days | Discharge: NURSING, REHAB OR LONG-TERM CARE | DRG: 480 | End: 2022-09-23
Attending: EMERGENCY MEDICINE | Admitting: INTERNAL MEDICINE
Payer: MEDICARE

## 2022-09-15 ENCOUNTER — ANESTHESIA EVENT (OUTPATIENT)
Dept: SURGERY | Age: 47
DRG: 480 | End: 2022-09-15
Payer: MEDICARE

## 2022-09-15 ENCOUNTER — APPOINTMENT (OUTPATIENT)
Dept: GENERAL RADIOLOGY | Age: 47
DRG: 480 | End: 2022-09-15
Attending: INTERNAL MEDICINE
Payer: MEDICARE

## 2022-09-15 DIAGNOSIS — R63.6 UNDERWEIGHT: Chronic | ICD-10-CM

## 2022-09-15 DIAGNOSIS — G82.50 QUADRIPLEGIA (HCC): Chronic | ICD-10-CM

## 2022-09-15 DIAGNOSIS — L89.313 RIGHT ISCHIAL PRESSURE SORE, STAGE 3 (HCC): Chronic | ICD-10-CM

## 2022-09-15 DIAGNOSIS — S72.301A CLOSED FRACTURE OF SHAFT OF RIGHT FEMUR, UNSPECIFIED FRACTURE MORPHOLOGY, INITIAL ENCOUNTER (HCC): Primary | ICD-10-CM

## 2022-09-15 PROBLEM — S72.91XA FEMUR FRACTURE, RIGHT (HCC): Status: ACTIVE | Noted: 2022-09-15

## 2022-09-15 PROBLEM — Z87.828 HISTORY OF MOTOR VEHICLE ACCIDENT: Chronic | Status: ACTIVE | Noted: 2022-09-15

## 2022-09-15 PROBLEM — L89.323 LEFT ISCHIAL PRESSURE SORE, STAGE III (HCC): Chronic | Status: ACTIVE | Noted: 2022-09-15

## 2022-09-15 PROBLEM — Z87.828 HISTORY OF MOTOR VEHICLE ACCIDENT: Status: ACTIVE | Noted: 2022-09-15

## 2022-09-15 PROBLEM — L89.323 LEFT ISCHIAL PRESSURE SORE, STAGE III (HCC): Status: ACTIVE | Noted: 2022-09-15

## 2022-09-15 PROBLEM — L89.44 PRESSURE ULCER OF CONTIGUOUS REGION INVOLVING BACK AND RIGHT BUTTOCK, STAGE 4 (HCC): Chronic | Status: ACTIVE | Noted: 2022-04-07

## 2022-09-15 LAB
ANION GAP SERPL CALC-SCNC: 5 MMOL/L (ref 3–18)
ATRIAL RATE: 92 BPM
BASOPHILS # BLD: 0 K/UL (ref 0–0.1)
BASOPHILS NFR BLD: 0 % (ref 0–2)
BUN SERPL-MCNC: 10 MG/DL (ref 7–18)
BUN/CREAT SERPL: 27 (ref 12–20)
CALCIUM SERPL-MCNC: 9.2 MG/DL (ref 8.5–10.1)
CALCULATED P AXIS, ECG09: 84 DEGREES
CALCULATED R AXIS, ECG10: 72 DEGREES
CALCULATED T AXIS, ECG11: 63 DEGREES
CHLORIDE SERPL-SCNC: 103 MMOL/L (ref 100–111)
CO2 SERPL-SCNC: 29 MMOL/L (ref 21–32)
CREAT SERPL-MCNC: 0.37 MG/DL (ref 0.6–1.3)
DIAGNOSIS, 93000: NORMAL
DIFFERENTIAL METHOD BLD: ABNORMAL
EOSINOPHIL # BLD: 0 K/UL (ref 0–0.4)
EOSINOPHIL NFR BLD: 0 % (ref 0–5)
ERYTHROCYTE [DISTWIDTH] IN BLOOD BY AUTOMATED COUNT: 14.6 % (ref 11.6–14.5)
FLUAV RNA SPEC QL NAA+PROBE: NOT DETECTED
FLUBV RNA SPEC QL NAA+PROBE: NOT DETECTED
GLUCOSE SERPL-MCNC: 99 MG/DL (ref 74–99)
HCT VFR BLD AUTO: 28.7 % (ref 36–48)
HGB BLD-MCNC: 9.4 G/DL (ref 13–16)
IMM GRANULOCYTES # BLD AUTO: 0 K/UL (ref 0–0.04)
IMM GRANULOCYTES NFR BLD AUTO: 0 % (ref 0–0.5)
INR PPP: 1.2 (ref 0.8–1.2)
LYMPHOCYTES # BLD: 1.5 K/UL (ref 0.9–3.6)
LYMPHOCYTES NFR BLD: 15 % (ref 21–52)
MCH RBC QN AUTO: 28.7 PG (ref 24–34)
MCHC RBC AUTO-ENTMCNC: 32.8 G/DL (ref 31–37)
MCV RBC AUTO: 87.8 FL (ref 78–100)
MONOCYTES # BLD: 0.6 K/UL (ref 0.05–1.2)
MONOCYTES NFR BLD: 6 % (ref 3–10)
NEUTS SEG # BLD: 7.8 K/UL (ref 1.8–8)
NEUTS SEG NFR BLD: 79 % (ref 40–73)
NRBC # BLD: 0 K/UL (ref 0–0.01)
NRBC BLD-RTO: 0 PER 100 WBC
P-R INTERVAL, ECG05: 156 MS
PLATELET # BLD AUTO: 416 K/UL (ref 135–420)
PMV BLD AUTO: 9.4 FL (ref 9.2–11.8)
POTASSIUM SERPL-SCNC: 3.5 MMOL/L (ref 3.5–5.5)
PROTHROMBIN TIME: 15.5 SEC (ref 11.5–15.2)
Q-T INTERVAL, ECG07: 336 MS
QRS DURATION, ECG06: 80 MS
QTC CALCULATION (BEZET), ECG08: 415 MS
RBC # BLD AUTO: 3.27 M/UL (ref 4.35–5.65)
SARS-COV-2, COV2: NOT DETECTED
SODIUM SERPL-SCNC: 137 MMOL/L (ref 136–145)
VENTRICULAR RATE, ECG03: 92 BPM
WBC # BLD AUTO: 9.9 K/UL (ref 4.6–13.2)

## 2022-09-15 PROCEDURE — 01230 ANES OPN UPPER 2/3 FEMUR NOS: CPT | Performed by: NURSE ANESTHETIST, CERTIFIED REGISTERED

## 2022-09-15 PROCEDURE — 65270000029 HC RM PRIVATE

## 2022-09-15 PROCEDURE — 76060000033 HC ANESTHESIA 1 TO 1.5 HR: Performed by: ORTHOPAEDIC SURGERY

## 2022-09-15 PROCEDURE — 74011250636 HC RX REV CODE- 250/636: Performed by: EMERGENCY MEDICINE

## 2022-09-15 PROCEDURE — 77030010509 HC AIRWY LMA MSK TELE -A: Performed by: STUDENT IN AN ORGANIZED HEALTH CARE EDUCATION/TRAINING PROGRAM

## 2022-09-15 PROCEDURE — C1713 ANCHOR/SCREW BN/BN,TIS/BN: HCPCS | Performed by: ORTHOPAEDIC SURGERY

## 2022-09-15 PROCEDURE — 01230 ANES OPN UPPER 2/3 FEMUR NOS: CPT | Performed by: STUDENT IN AN ORGANIZED HEALTH CARE EDUCATION/TRAINING PROGRAM

## 2022-09-15 PROCEDURE — 93005 ELECTROCARDIOGRAM TRACING: CPT

## 2022-09-15 PROCEDURE — 99221 1ST HOSP IP/OBS SF/LOW 40: CPT | Performed by: PHYSICIAN ASSISTANT

## 2022-09-15 PROCEDURE — 74011250637 HC RX REV CODE- 250/637: Performed by: NURSE ANESTHETIST, CERTIFIED REGISTERED

## 2022-09-15 PROCEDURE — 77030040922 HC BLNKT HYPOTHRM STRY -A: Performed by: ORTHOPAEDIC SURGERY

## 2022-09-15 PROCEDURE — 99285 EMERGENCY DEPT VISIT HI MDM: CPT

## 2022-09-15 PROCEDURE — 71045 X-RAY EXAM CHEST 1 VIEW: CPT

## 2022-09-15 PROCEDURE — 77030000031 HC BIT DRL QC SYNT -C: Performed by: ORTHOPAEDIC SURGERY

## 2022-09-15 PROCEDURE — 74011250636 HC RX REV CODE- 250/636: Performed by: ORTHOPAEDIC SURGERY

## 2022-09-15 PROCEDURE — 86923 COMPATIBILITY TEST ELECTRIC: CPT

## 2022-09-15 PROCEDURE — 77030040361 HC SLV COMPR DVT MDII -B: Performed by: ORTHOPAEDIC SURGERY

## 2022-09-15 PROCEDURE — 80048 BASIC METABOLIC PNL TOTAL CA: CPT

## 2022-09-15 PROCEDURE — 74011000250 HC RX REV CODE- 250: Performed by: NURSE ANESTHETIST, CERTIFIED REGISTERED

## 2022-09-15 PROCEDURE — 77030002933 HC SUT MCRYL J&J -A: Performed by: ORTHOPAEDIC SURGERY

## 2022-09-15 PROCEDURE — 85610 PROTHROMBIN TIME: CPT

## 2022-09-15 PROCEDURE — 77030013079 HC BLNKT BAIR HGGR 3M -A: Performed by: STUDENT IN AN ORGANIZED HEALTH CARE EDUCATION/TRAINING PROGRAM

## 2022-09-15 PROCEDURE — 77030031139 HC SUT VCRL2 J&J -A: Performed by: ORTHOPAEDIC SURGERY

## 2022-09-15 PROCEDURE — 74011250636 HC RX REV CODE- 250/636: Performed by: INTERNAL MEDICINE

## 2022-09-15 PROCEDURE — 99223 1ST HOSP IP/OBS HIGH 75: CPT | Performed by: INTERNAL MEDICINE

## 2022-09-15 PROCEDURE — 2709999900 HC NON-CHARGEABLE SUPPLY: Performed by: ORTHOPAEDIC SURGERY

## 2022-09-15 PROCEDURE — 74011250637 HC RX REV CODE- 250/637: Performed by: PHYSICIAN ASSISTANT

## 2022-09-15 PROCEDURE — 74011250636 HC RX REV CODE- 250/636: Performed by: NURSE ANESTHETIST, CERTIFIED REGISTERED

## 2022-09-15 PROCEDURE — 87636 SARSCOV2 & INF A&B AMP PRB: CPT

## 2022-09-15 PROCEDURE — C1769 GUIDE WIRE: HCPCS | Performed by: ORTHOPAEDIC SURGERY

## 2022-09-15 PROCEDURE — 74011250637 HC RX REV CODE- 250/637: Performed by: HOSPITALIST

## 2022-09-15 PROCEDURE — 77030016474 HC BIT DRL QC3 SYNT -C: Performed by: ORTHOPAEDIC SURGERY

## 2022-09-15 PROCEDURE — 74011000250 HC RX REV CODE- 250: Performed by: PHYSICIAN ASSISTANT

## 2022-09-15 PROCEDURE — 96374 THER/PROPH/DIAG INJ IV PUSH: CPT

## 2022-09-15 PROCEDURE — 74011250637 HC RX REV CODE- 250/637: Performed by: INTERNAL MEDICINE

## 2022-09-15 PROCEDURE — 74011250636 HC RX REV CODE- 250/636: Performed by: PHYSICIAN ASSISTANT

## 2022-09-15 PROCEDURE — 77030018836 HC SOL IRR NACL ICUM -A: Performed by: ORTHOPAEDIC SURGERY

## 2022-09-15 PROCEDURE — 76010000149 HC OR TIME 1 TO 1.5 HR: Performed by: ORTHOPAEDIC SURGERY

## 2022-09-15 PROCEDURE — 77030008462 HC STPLR SKN PROX J&J -A: Performed by: ORTHOPAEDIC SURGERY

## 2022-09-15 PROCEDURE — 76210000016 HC OR PH I REC 1 TO 1.5 HR: Performed by: ORTHOPAEDIC SURGERY

## 2022-09-15 PROCEDURE — 73552 X-RAY EXAM OF FEMUR 2/>: CPT

## 2022-09-15 PROCEDURE — 86900 BLOOD TYPING SEROLOGIC ABO: CPT

## 2022-09-15 PROCEDURE — 2709999900 HC NON-CHARGEABLE SUPPLY

## 2022-09-15 PROCEDURE — 77030014405 HC GD ROD RMR SYNT -C: Performed by: ORTHOPAEDIC SURGERY

## 2022-09-15 PROCEDURE — 85025 COMPLETE CBC W/AUTO DIFF WBC: CPT

## 2022-09-15 PROCEDURE — 0QH806Z INSERTION OF INTRAMEDULLARY INTERNAL FIXATION DEVICE INTO RIGHT FEMORAL SHAFT, OPEN APPROACH: ICD-10-PCS | Performed by: ORTHOPAEDIC SURGERY

## 2022-09-15 PROCEDURE — 27506 TREATMENT OF THIGH FRACTURE: CPT | Performed by: ORTHOPAEDIC SURGERY

## 2022-09-15 DEVICE — IMPLANTABLE DEVICE: Type: IMPLANTABLE DEVICE | Site: FEMUR | Status: FUNCTIONAL

## 2022-09-15 DEVICE — SCREW BNE L80MM DIA5MM NONSTERILE TIB LT GRN TI ST CANN LOK: Type: IMPLANTABLE DEVICE | Site: FEMUR | Status: FUNCTIONAL

## 2022-09-15 DEVICE — SCREW BNE L36MM DIA5MM NONSTERILE TIB LT GRN TI ST CANN LOK: Type: IMPLANTABLE DEVICE | Site: FEMUR | Status: FUNCTIONAL

## 2022-09-15 DEVICE — SCREW BNE L60MM DIA5MM NONSTERILE TIB LT GRN TI ST CANN LOK: Type: IMPLANTABLE DEVICE | Site: FEMUR | Status: FUNCTIONAL

## 2022-09-15 RX ORDER — ONDANSETRON 2 MG/ML
INJECTION INTRAMUSCULAR; INTRAVENOUS AS NEEDED
Status: DISCONTINUED | OUTPATIENT
Start: 2022-09-15 | End: 2022-09-15 | Stop reason: HOSPADM

## 2022-09-15 RX ORDER — MORPHINE SULFATE 2 MG/ML
1-2 INJECTION, SOLUTION INTRAMUSCULAR; INTRAVENOUS
Status: DISCONTINUED | OUTPATIENT
Start: 2022-09-15 | End: 2022-09-19

## 2022-09-15 RX ORDER — SODIUM CHLORIDE, SODIUM LACTATE, POTASSIUM CHLORIDE, CALCIUM CHLORIDE 600; 310; 30; 20 MG/100ML; MG/100ML; MG/100ML; MG/100ML
INJECTION, SOLUTION INTRAVENOUS
Status: DISCONTINUED | OUTPATIENT
Start: 2022-09-15 | End: 2022-09-15 | Stop reason: HOSPADM

## 2022-09-15 RX ORDER — PROPOFOL 10 MG/ML
INJECTION, EMULSION INTRAVENOUS AS NEEDED
Status: DISCONTINUED | OUTPATIENT
Start: 2022-09-15 | End: 2022-09-15 | Stop reason: HOSPADM

## 2022-09-15 RX ORDER — DIAZEPAM 5 MG/1
10 TABLET ORAL
Status: DISCONTINUED | OUTPATIENT
Start: 2022-09-15 | End: 2022-09-23 | Stop reason: HOSPADM

## 2022-09-15 RX ORDER — DEXAMETHASONE SODIUM PHOSPHATE 4 MG/ML
INJECTION, SOLUTION INTRA-ARTICULAR; INTRALESIONAL; INTRAMUSCULAR; INTRAVENOUS; SOFT TISSUE AS NEEDED
Status: DISCONTINUED | OUTPATIENT
Start: 2022-09-15 | End: 2022-09-15 | Stop reason: HOSPADM

## 2022-09-15 RX ORDER — OXYCODONE AND ACETAMINOPHEN 5; 325 MG/1; MG/1
1 TABLET ORAL ONCE
Status: DISCONTINUED | OUTPATIENT
Start: 2022-09-15 | End: 2022-09-15 | Stop reason: HOSPADM

## 2022-09-15 RX ORDER — SODIUM CHLORIDE, SODIUM LACTATE, POTASSIUM CHLORIDE, CALCIUM CHLORIDE 600; 310; 30; 20 MG/100ML; MG/100ML; MG/100ML; MG/100ML
125 INJECTION, SOLUTION INTRAVENOUS CONTINUOUS
Status: DISCONTINUED | OUTPATIENT
Start: 2022-09-15 | End: 2022-09-15 | Stop reason: HOSPADM

## 2022-09-15 RX ORDER — EPHEDRINE SULFATE/0.9% NACL/PF 50 MG/5 ML
SYRINGE (ML) INTRAVENOUS AS NEEDED
Status: DISCONTINUED | OUTPATIENT
Start: 2022-09-15 | End: 2022-09-15 | Stop reason: HOSPADM

## 2022-09-15 RX ORDER — IPRATROPIUM BROMIDE AND ALBUTEROL SULFATE 2.5; .5 MG/3ML; MG/3ML
3 SOLUTION RESPIRATORY (INHALATION)
Status: DISCONTINUED | OUTPATIENT
Start: 2022-09-15 | End: 2022-09-23 | Stop reason: HOSPADM

## 2022-09-15 RX ORDER — CHOLECALCIFEROL (VITAMIN D3) 125 MCG
5 CAPSULE ORAL
Status: DISCONTINUED | OUTPATIENT
Start: 2022-09-15 | End: 2022-09-23 | Stop reason: HOSPADM

## 2022-09-15 RX ORDER — POLYETHYLENE GLYCOL 3350 17 G/17G
17 POWDER, FOR SOLUTION ORAL DAILY
Status: DISCONTINUED | OUTPATIENT
Start: 2022-09-15 | End: 2022-09-23 | Stop reason: HOSPADM

## 2022-09-15 RX ORDER — SODIUM CHLORIDE 9 MG/ML
500 INJECTION, SOLUTION INTRAVENOUS ONCE
Status: DISPENSED | OUTPATIENT
Start: 2022-09-15 | End: 2022-09-16

## 2022-09-15 RX ORDER — TAMSULOSIN HYDROCHLORIDE 0.4 MG/1
0.4 CAPSULE ORAL DAILY
Status: DISCONTINUED | OUTPATIENT
Start: 2022-09-15 | End: 2022-09-23 | Stop reason: HOSPADM

## 2022-09-15 RX ORDER — BACLOFEN 10 MG/1
20 TABLET ORAL 4 TIMES DAILY
Status: DISCONTINUED | OUTPATIENT
Start: 2022-09-15 | End: 2022-09-23 | Stop reason: HOSPADM

## 2022-09-15 RX ORDER — OXYBUTYNIN CHLORIDE 5 MG/1
10 TABLET, EXTENDED RELEASE ORAL DAILY
Status: DISCONTINUED | OUTPATIENT
Start: 2022-09-15 | End: 2022-09-23 | Stop reason: HOSPADM

## 2022-09-15 RX ORDER — MIDAZOLAM HYDROCHLORIDE 1 MG/ML
INJECTION, SOLUTION INTRAMUSCULAR; INTRAVENOUS AS NEEDED
Status: DISCONTINUED | OUTPATIENT
Start: 2022-09-15 | End: 2022-09-15 | Stop reason: HOSPADM

## 2022-09-15 RX ORDER — ENOXAPARIN SODIUM 100 MG/ML
30 INJECTION SUBCUTANEOUS EVERY 24 HOURS
Status: DISCONTINUED | OUTPATIENT
Start: 2022-09-16 | End: 2022-09-22

## 2022-09-15 RX ORDER — POLYETHYLENE GLYCOL 3350 17 G/17G
17 POWDER, FOR SOLUTION ORAL DAILY PRN
Status: DISCONTINUED | OUTPATIENT
Start: 2022-09-15 | End: 2022-09-23 | Stop reason: HOSPADM

## 2022-09-15 RX ORDER — SODIUM CHLORIDE 0.9 % (FLUSH) 0.9 %
5-40 SYRINGE (ML) INJECTION EVERY 8 HOURS
Status: DISCONTINUED | OUTPATIENT
Start: 2022-09-15 | End: 2022-09-23 | Stop reason: HOSPADM

## 2022-09-15 RX ORDER — NALOXONE HYDROCHLORIDE 0.4 MG/ML
0.4 INJECTION, SOLUTION INTRAMUSCULAR; INTRAVENOUS; SUBCUTANEOUS
Status: DISCONTINUED | OUTPATIENT
Start: 2022-09-15 | End: 2022-09-23 | Stop reason: HOSPADM

## 2022-09-15 RX ORDER — ONDANSETRON 2 MG/ML
4 INJECTION INTRAMUSCULAR; INTRAVENOUS ONCE
Status: COMPLETED | OUTPATIENT
Start: 2022-09-15 | End: 2022-09-15

## 2022-09-15 RX ORDER — CEFAZOLIN SODIUM 1 G/3ML
INJECTION, POWDER, FOR SOLUTION INTRAMUSCULAR; INTRAVENOUS AS NEEDED
Status: DISCONTINUED | OUTPATIENT
Start: 2022-09-15 | End: 2022-09-15 | Stop reason: HOSPADM

## 2022-09-15 RX ORDER — HYDROMORPHONE HYDROCHLORIDE 1 MG/ML
0.2 INJECTION, SOLUTION INTRAMUSCULAR; INTRAVENOUS; SUBCUTANEOUS
Status: DISCONTINUED | OUTPATIENT
Start: 2022-09-15 | End: 2022-09-15 | Stop reason: HOSPADM

## 2022-09-15 RX ORDER — MORPHINE SULFATE 4 MG/ML
4 INJECTION INTRAVENOUS ONCE
Status: COMPLETED | OUTPATIENT
Start: 2022-09-15 | End: 2022-09-15

## 2022-09-15 RX ORDER — SODIUM CHLORIDE 9 MG/ML
75 INJECTION, SOLUTION INTRAVENOUS CONTINUOUS
Status: DISCONTINUED | OUTPATIENT
Start: 2022-09-15 | End: 2022-09-16

## 2022-09-15 RX ORDER — LIDOCAINE HYDROCHLORIDE 20 MG/ML
INJECTION, SOLUTION EPIDURAL; INFILTRATION; INTRACAUDAL; PERINEURAL AS NEEDED
Status: DISCONTINUED | OUTPATIENT
Start: 2022-09-15 | End: 2022-09-15 | Stop reason: HOSPADM

## 2022-09-15 RX ORDER — SODIUM CHLORIDE 0.9 % (FLUSH) 0.9 %
5-40 SYRINGE (ML) INJECTION AS NEEDED
Status: DISCONTINUED | OUTPATIENT
Start: 2022-09-15 | End: 2022-09-23 | Stop reason: HOSPADM

## 2022-09-15 RX ORDER — FENTANYL CITRATE 50 UG/ML
INJECTION, SOLUTION INTRAMUSCULAR; INTRAVENOUS AS NEEDED
Status: DISCONTINUED | OUTPATIENT
Start: 2022-09-15 | End: 2022-09-15 | Stop reason: HOSPADM

## 2022-09-15 RX ORDER — ACETAMINOPHEN 325 MG/1
650 TABLET ORAL
Status: DISCONTINUED | OUTPATIENT
Start: 2022-09-15 | End: 2022-09-23 | Stop reason: HOSPADM

## 2022-09-15 RX ORDER — SENNOSIDES 8.6 MG/1
1 TABLET ORAL
Status: DISCONTINUED | OUTPATIENT
Start: 2022-09-15 | End: 2022-09-21

## 2022-09-15 RX ORDER — FAMOTIDINE 20 MG/1
20 TABLET, FILM COATED ORAL 2 TIMES DAILY
Status: DISCONTINUED | OUTPATIENT
Start: 2022-09-15 | End: 2022-09-23 | Stop reason: HOSPADM

## 2022-09-15 RX ORDER — SODIUM CHLORIDE 0.9 % (FLUSH) 0.9 %
5-40 SYRINGE (ML) INJECTION EVERY 8 HOURS
Status: DISCONTINUED | OUTPATIENT
Start: 2022-09-15 | End: 2022-09-15 | Stop reason: HOSPADM

## 2022-09-15 RX ORDER — MORPHINE SULFATE 4 MG/ML
4 INJECTION INTRAVENOUS
Status: COMPLETED | OUTPATIENT
Start: 2022-09-15 | End: 2022-09-15

## 2022-09-15 RX ORDER — SODIUM CHLORIDE 0.9 % (FLUSH) 0.9 %
5-40 SYRINGE (ML) INJECTION AS NEEDED
Status: DISCONTINUED | OUTPATIENT
Start: 2022-09-15 | End: 2022-09-15 | Stop reason: HOSPADM

## 2022-09-15 RX ORDER — FACIAL-BODY WIPES
10 EACH TOPICAL
Status: DISCONTINUED | OUTPATIENT
Start: 2022-09-15 | End: 2022-09-23 | Stop reason: HOSPADM

## 2022-09-15 RX ORDER — MORPHINE SULFATE 2 MG/ML
2-4 INJECTION, SOLUTION INTRAMUSCULAR; INTRAVENOUS
Status: DISCONTINUED | OUTPATIENT
Start: 2022-09-15 | End: 2022-09-16 | Stop reason: SDUPTHER

## 2022-09-15 RX ORDER — NALOXONE HYDROCHLORIDE 0.4 MG/ML
0.04 INJECTION, SOLUTION INTRAMUSCULAR; INTRAVENOUS; SUBCUTANEOUS AS NEEDED
Status: DISCONTINUED | OUTPATIENT
Start: 2022-09-15 | End: 2022-09-15 | Stop reason: HOSPADM

## 2022-09-15 RX ORDER — MIDODRINE HYDROCHLORIDE 5 MG/1
10 TABLET ORAL
Status: DISCONTINUED | OUTPATIENT
Start: 2022-09-15 | End: 2022-09-19

## 2022-09-15 RX ORDER — ONDANSETRON 2 MG/ML
4 INJECTION INTRAMUSCULAR; INTRAVENOUS
Status: DISCONTINUED | OUTPATIENT
Start: 2022-09-15 | End: 2022-09-23 | Stop reason: HOSPADM

## 2022-09-15 RX ORDER — OXYCODONE AND ACETAMINOPHEN 5; 325 MG/1; MG/1
1-2 TABLET ORAL
Status: DISCONTINUED | OUTPATIENT
Start: 2022-09-15 | End: 2022-09-23 | Stop reason: HOSPADM

## 2022-09-15 RX ORDER — ONDANSETRON 4 MG/1
4 TABLET, ORALLY DISINTEGRATING ORAL
Status: DISCONTINUED | OUTPATIENT
Start: 2022-09-15 | End: 2022-09-23 | Stop reason: HOSPADM

## 2022-09-15 RX ORDER — FENTANYL CITRATE 50 UG/ML
25 INJECTION, SOLUTION INTRAMUSCULAR; INTRAVENOUS AS NEEDED
Status: DISCONTINUED | OUTPATIENT
Start: 2022-09-15 | End: 2022-09-15 | Stop reason: HOSPADM

## 2022-09-15 RX ADMIN — SODIUM CHLORIDE, PRESERVATIVE FREE 10 ML: 5 INJECTION INTRAVENOUS at 16:51

## 2022-09-15 RX ADMIN — MORPHINE SULFATE 4 MG: 4 INJECTION, SOLUTION INTRAMUSCULAR; INTRAVENOUS at 04:14

## 2022-09-15 RX ADMIN — LIDOCAINE HYDROCHLORIDE 50 MG: 20 INJECTION, SOLUTION EPIDURAL; INFILTRATION; INTRACAUDAL; PERINEURAL at 12:43

## 2022-09-15 RX ADMIN — BACLOFEN 20 MG: 10 TABLET ORAL at 22:28

## 2022-09-15 RX ADMIN — Medication 5 MG: at 13:44

## 2022-09-15 RX ADMIN — CEFAZOLIN 2 G: 1 INJECTION, POWDER, FOR SOLUTION INTRAMUSCULAR; INTRAVENOUS at 21:00

## 2022-09-15 RX ADMIN — DIAZEPAM 10 MG: 5 TABLET ORAL at 21:00

## 2022-09-15 RX ADMIN — Medication 5 MG: at 13:03

## 2022-09-15 RX ADMIN — OXYCODONE AND ACETAMINOPHEN 1 TABLET: 325; 5 TABLET ORAL at 15:10

## 2022-09-15 RX ADMIN — CEFAZOLIN SODIUM 2 G: 1 INJECTION, POWDER, FOR SOLUTION INTRAMUSCULAR; INTRAVENOUS at 12:58

## 2022-09-15 RX ADMIN — MORPHINE SULFATE 4 MG: 4 INJECTION INTRAVENOUS at 03:04

## 2022-09-15 RX ADMIN — FENTANYL CITRATE 25 MCG: 50 INJECTION, SOLUTION INTRAMUSCULAR; INTRAVENOUS at 12:41

## 2022-09-15 RX ADMIN — FENTANYL CITRATE 25 MCG: 50 INJECTION, SOLUTION INTRAMUSCULAR; INTRAVENOUS at 13:49

## 2022-09-15 RX ADMIN — FENTANYL CITRATE 25 MCG: 50 INJECTION, SOLUTION INTRAMUSCULAR; INTRAVENOUS at 13:59

## 2022-09-15 RX ADMIN — MORPHINE SULFATE 4 MG: 2 INJECTION, SOLUTION INTRAMUSCULAR; INTRAVENOUS at 09:59

## 2022-09-15 RX ADMIN — OXYBUTYNIN CHLORIDE 10 MG: 5 TABLET, EXTENDED RELEASE ORAL at 09:51

## 2022-09-15 RX ADMIN — MORPHINE SULFATE 2 MG: 2 INJECTION, SOLUTION INTRAMUSCULAR; INTRAVENOUS at 20:58

## 2022-09-15 RX ADMIN — FAMOTIDINE 20 MG: 20 TABLET ORAL at 09:51

## 2022-09-15 RX ADMIN — ONDANSETRON 4 MG: 2 INJECTION INTRAMUSCULAR; INTRAVENOUS at 13:14

## 2022-09-15 RX ADMIN — MIDAZOLAM HYDROCHLORIDE 2 MG: 2 INJECTION, SOLUTION INTRAMUSCULAR; INTRAVENOUS at 12:35

## 2022-09-15 RX ADMIN — PROPOFOL 90 MG: 10 INJECTION, EMULSION INTRAVENOUS at 12:43

## 2022-09-15 RX ADMIN — ONDANSETRON 4 MG: 2 INJECTION INTRAMUSCULAR; INTRAVENOUS at 14:30

## 2022-09-15 RX ADMIN — DEXAMETHASONE SODIUM PHOSPHATE 4 MG: 4 INJECTION, SOLUTION INTRAMUSCULAR; INTRAVENOUS at 13:14

## 2022-09-15 RX ADMIN — MIDODRINE HYDROCHLORIDE 10 MG: 5 TABLET ORAL at 19:22

## 2022-09-15 RX ADMIN — Medication 5 MG: at 13:12

## 2022-09-15 RX ADMIN — FENTANYL CITRATE 25 MCG: 50 INJECTION INTRAMUSCULAR; INTRAVENOUS at 14:30

## 2022-09-15 RX ADMIN — TAMSULOSIN HYDROCHLORIDE 0.4 MG: 0.4 CAPSULE ORAL at 09:51

## 2022-09-15 RX ADMIN — SODIUM CHLORIDE, PRESERVATIVE FREE 10 ML: 5 INJECTION INTRAVENOUS at 22:29

## 2022-09-15 RX ADMIN — SODIUM CHLORIDE, SODIUM LACTATE, POTASSIUM CHLORIDE, AND CALCIUM CHLORIDE: 600; 310; 30; 20 INJECTION, SOLUTION INTRAVENOUS at 12:32

## 2022-09-15 RX ADMIN — FENTANYL CITRATE 25 MCG: 50 INJECTION, SOLUTION INTRAMUSCULAR; INTRAVENOUS at 13:26

## 2022-09-15 NOTE — ROUTINE PROCESS
TRANSFER - IN REPORT:    Verbal report received from [facility] RN(name) on Asya Franco  being received from [address] [place] [address](unit) for ordered procedure      Report consisted of patients Situation, Background, Assessment and   Recommendations(SBAR). Information from the following report(s) SBAR and Kardex was reviewed with the receiving nurse. Opportunity for questions and clarification was provided. Assessment completed upon patients arrival to unit and care assumed.
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
## 2022-09-15 NOTE — ED NOTES
Patient awake and alert brought in by EMS after as per patient he was manipulating his leg to remove his leg bag when he heard a pop. Patient with notable area of swelling to right thigh.

## 2022-09-15 NOTE — ANESTHESIA PREPROCEDURE EVALUATION
Relevant Problems   RESPIRATORY SYSTEM   (+) CAP (community acquired pneumonia)       Anesthetic History   No history of anesthetic complications            Review of Systems / Medical History  Patient summary reviewed, nursing notes reviewed and pertinent labs reviewed    Pulmonary  Within defined limits                 Neuro/Psych   Within defined limits          Comments: quadraplegic (C5-C7 SCI 1998) Cardiovascular  Within defined limits                     GI/Hepatic/Renal  Within defined limits              Endo/Other  Within defined limits           Other Findings              Physical Exam    Airway  Mallampati: III  TM Distance: 4 - 6 cm  Neck ROM: decreased range of motion   Mouth opening: Normal    Comments: Almost no cervical neck extension Cardiovascular    Rhythm: regular  Rate: normal         Dental    Dentition: Poor dentition     Pulmonary  Breath sounds clear to auscultation               Abdominal  GI exam deferred       Other Findings            Anesthetic Plan    ASA: 3  Anesthesia type: general          Induction: Intravenous  Anesthetic plan and risks discussed with: Patient      Pt refusing to take off sunglasses, explained risk of loosing them or damage to them, he understands and accepts that risk.

## 2022-09-15 NOTE — ROUTINE PROCESS
TRANSFER - IN REPORT:    Verbal report received from Peg Brady RN  on Antony Lamansley  being received from [id number] [place] [address] for ordered procedure      Report consisted of patients Situation, Background, Assessment and   Recommendations(SBAR). Information from the following report(s) SBAR was reviewed with the receiving nurse. Opportunity for questions and clarification was provided. Assessment completed upon patients arrival to unit and care assumed.

## 2022-09-15 NOTE — WOUND CARE
Physical Exam  Patient resting quietly awaiting surgery. Wound care evaluation deferred at this time due to patients request and pain. Will attempt eval at another time.     Indra BRAGGN, RN, [address] Wound Care Dept.  [phone]
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
## 2022-09-15 NOTE — PROGRESS NOTES
INTERIM UPDATE - 0402 EST on 9/15/2022    [facility] ER Physician calls requesting admission for a 69-year-old Patient with Paraplegia (Quadriplegia?) who reportedly heard his right femur crack (this AM?) while attempting to chew through the strap of his leg bag to remove it (which is apparently how he has removed it in the past). Orthopedic Surgical services were contacted by [facility] ER Physician and stated that they will see Patient in the AM to make a determination of whether or not this Patient will require surgical intervention (possibly not, as Patient reportedly does not use his legs for mobility). Nursing Staff reports reasonable Vitals. Given history of spinal cord injury and opportunity for autonomic dysfunction, will admit Patient to Telemetry Unit for improved monitoring of Blood Pressure. Plan:  Accept Patient to [facility] Telemetry Unit for management of Right Fractured Femur.
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
## 2022-09-15 NOTE — ANESTHESIA POSTPROCEDURE EVALUATION
Procedure(s):  RIGHT FEMUR RETROGRADE NAIL. general    Anesthesia Post Evaluation      Multimodal analgesia: multimodal analgesia used between 6 hours prior to anesthesia start to PACU discharge  Patient location during evaluation: PACU  Patient participation: complete - patient participated  Level of consciousness: sleepy but conscious  Pain management: adequate  Airway patency: patent  Anesthetic complications: no  Cardiovascular status: acceptable  Respiratory status: acceptable  Hydration status: acceptable  Post anesthesia nausea and vomiting:  controlled  Final Post Anesthesia Temperature Assessment:  Normothermia (36.0-37.5 degrees C)      INITIAL Post-op Vital signs:   Vitals Value Taken Time   /59 09/15/22 1408   Temp 36.7 °C (98 °F) 09/15/22 1408   Pulse 78 09/15/22 1411   Resp 13 09/15/22 1411   SpO2 100 % 09/15/22 1411   Vitals shown include unvalidated device data.

## 2022-09-15 NOTE — OP NOTES
Operative Report    Patient: Javier Tapia MRN: [id number]  SSN: [id number]    YOB: 1975  Age: 49 y.o. Sex: male       Date of Surgery: 9/15/2022     Preoperative Diagnosis: RIGHT FEMUR FRACTURE     Postoperative Diagnosis: * No post-op diagnosis entered *     Surgeon(s) and Role:     Tiago Ayala MD - Primary    Anesthesia: General     Procedure: Procedure(s):  RIGHT FEMUR RETROGRADE NAIL     Procedure in Detail: Patient was taken to the operating room Doser general trach anesthesia with anesthesia staff. Right leg was prepped with ChloraPrep solution draped as a free sterile field. He had significant flexion contractures in the knee for which a triangle was used. Midline incision over the knee was made subcutaneous tissue dissected bluntly to the left medial retinaculum which medial red parapatellar arthrotomy was then performed. Entry point was accessed at the distal end of the femur with placing a guidepin followed by a reamer. A guidepin across the fracture site was placed and was measured as 380 the fracture was reduced and a 380 x 12 mm yusef was impacted into place. Through the targeting device distally through the 2 trochars through stab incisions 2 screws were placed distally through the yusef and 1 screw proximally for a freehand technique through a stab incision. Effecting very stable construct. Parapatellar arthrotomy was closed with #2 Vicryl subcutaneous tissues with 2-0 Vicryl and the skin with staples. Sterile dressings were applied patient tolerated procedure well was taken to recovery room without problems      Estimated Blood Loss: Less than 50 cc    Tourniquet Time: * No tourniquets in log *      Implants:   Implant Name Type Inv.  Item Serial No.  Lot No. LRB No. Used Action   NAIL IM [id number] UNIV FEM GRN Janus Melissa  NAIL IM [id number] [id number] UNIV FEM GRN TI BANDAR DONELL  [employer] [id number] Right 1 Implanted Specimens: * No specimens in log *        Drains: None                Complications: None    Counts: Sponge and needle counts were correct times two.     Signed By:  Bib Quan MD     September 15, 2022

## 2022-09-15 NOTE — PROGRESS NOTES
Date of Surgery Update:  Prema Damon was seen and examined. History and physical has been reviewed. The patient has been examined. There have been no significant clinical changes since the completion of the originally dated History and Physical.    Signed By: Cira Barraza MD     September 15, 2022 12:20 PM        The above patient was independently seen and examined by myself. The case was then discussed and a proper diagnosis/plan was made. I agree with the above assessment.

## 2022-09-15 NOTE — PROGRESS NOTES
[address] MD: pt was brought from the OR from having a right femur retrograde nail 2 hours ago. pt is c/o pain. BP was 95/59 @ 1620. I just rechecked it and it is 91/58. pt has pain 7/10 and wants percocet and also has scheduled baclofen due. would you like me to bolus pt?    1742: Johnny Rivera MD messaged: give 500 mL bolus    1743: verified with Johnny Rivera MD for bolus to be 0.9 NS    1744: Lazara CAMARENA messaged: 0.9 NS     1851: Mikayla Borrero MD: pt completed bolus. manual bp 90/58. pain still 7/10 to right femur. 1856Esme Dumas MD messaged: I added Midodrine and cont IVF.  You can give Percocet along with midodrine

## 2022-09-16 LAB
ANION GAP SERPL CALC-SCNC: 5 MMOL/L (ref 3–18)
BASOPHILS # BLD: 0 K/UL (ref 0–0.1)
BASOPHILS NFR BLD: 0 % (ref 0–2)
BUN SERPL-MCNC: 8 MG/DL (ref 7–18)
BUN/CREAT SERPL: 22 (ref 12–20)
CALCIUM SERPL-MCNC: 8.6 MG/DL (ref 8.5–10.1)
CHLORIDE SERPL-SCNC: 104 MMOL/L (ref 100–111)
CO2 SERPL-SCNC: 29 MMOL/L (ref 21–32)
CREAT SERPL-MCNC: 0.37 MG/DL (ref 0.6–1.3)
DIFFERENTIAL METHOD BLD: ABNORMAL
EOSINOPHIL # BLD: 0 K/UL (ref 0–0.4)
EOSINOPHIL NFR BLD: 0 % (ref 0–5)
ERYTHROCYTE [DISTWIDTH] IN BLOOD BY AUTOMATED COUNT: 14.5 % (ref 11.6–14.5)
GLUCOSE SERPL-MCNC: 113 MG/DL (ref 74–99)
HCT VFR BLD AUTO: 25.4 % (ref 36–48)
HGB BLD-MCNC: 8.1 G/DL (ref 13–16)
IMM GRANULOCYTES # BLD AUTO: 0 K/UL (ref 0–0.04)
IMM GRANULOCYTES NFR BLD AUTO: 0 % (ref 0–0.5)
LYMPHOCYTES # BLD: 1.9 K/UL (ref 0.9–3.6)
LYMPHOCYTES NFR BLD: 26 % (ref 21–52)
MCH RBC QN AUTO: 28.5 PG (ref 24–34)
MCHC RBC AUTO-ENTMCNC: 31.9 G/DL (ref 31–37)
MCV RBC AUTO: 89.4 FL (ref 78–100)
MONOCYTES # BLD: 0.5 K/UL (ref 0.05–1.2)
MONOCYTES NFR BLD: 7 % (ref 3–10)
NEUTS SEG # BLD: 5 K/UL (ref 1.8–8)
NEUTS SEG NFR BLD: 67 % (ref 40–73)
NRBC # BLD: 0 K/UL (ref 0–0.01)
NRBC BLD-RTO: 0 PER 100 WBC
PLATELET # BLD AUTO: 388 K/UL (ref 135–420)
PMV BLD AUTO: 9.6 FL (ref 9.2–11.8)
POTASSIUM SERPL-SCNC: 3.8 MMOL/L (ref 3.5–5.5)
RBC # BLD AUTO: 2.84 M/UL (ref 4.35–5.65)
SODIUM SERPL-SCNC: 138 MMOL/L (ref 136–145)
WBC # BLD AUTO: 7.6 K/UL (ref 4.6–13.2)

## 2022-09-16 PROCEDURE — 74011000250 HC RX REV CODE- 250: Performed by: PHYSICIAN ASSISTANT

## 2022-09-16 PROCEDURE — 77030041076 HC DRSG AG OPTICELL MDII -A

## 2022-09-16 PROCEDURE — 85025 COMPLETE CBC W/AUTO DIFF WBC: CPT

## 2022-09-16 PROCEDURE — 74011250637 HC RX REV CODE- 250/637: Performed by: PHYSICIAN ASSISTANT

## 2022-09-16 PROCEDURE — 74011250636 HC RX REV CODE- 250/636: Performed by: PHYSICIAN ASSISTANT

## 2022-09-16 PROCEDURE — 2709999900 HC NON-CHARGEABLE SUPPLY

## 2022-09-16 PROCEDURE — 77030018842 HC SOL IRR SOD CL 9% BAXT -A

## 2022-09-16 PROCEDURE — 80048 BASIC METABOLIC PNL TOTAL CA: CPT

## 2022-09-16 PROCEDURE — 74011250636 HC RX REV CODE- 250/636: Performed by: HOSPITALIST

## 2022-09-16 PROCEDURE — 74011250637 HC RX REV CODE- 250/637: Performed by: HOSPITALIST

## 2022-09-16 PROCEDURE — 99232 SBSQ HOSP IP/OBS MODERATE 35: CPT | Performed by: HOSPITALIST

## 2022-09-16 PROCEDURE — 65270000029 HC RM PRIVATE

## 2022-09-16 PROCEDURE — 36415 COLL VENOUS BLD VENIPUNCTURE: CPT

## 2022-09-16 RX ADMIN — TAMSULOSIN HYDROCHLORIDE 0.4 MG: 0.4 CAPSULE ORAL at 10:09

## 2022-09-16 RX ADMIN — SODIUM CHLORIDE, PRESERVATIVE FREE 10 ML: 5 INJECTION INTRAVENOUS at 22:11

## 2022-09-16 RX ADMIN — SENNOSIDES 8.6 MG: 8.6 TABLET, FILM COATED ORAL at 22:08

## 2022-09-16 RX ADMIN — MIDODRINE HYDROCHLORIDE 10 MG: 5 TABLET ORAL at 14:57

## 2022-09-16 RX ADMIN — SODIUM CHLORIDE 75 ML/HR: 9 INJECTION, SOLUTION INTRAVENOUS at 16:13

## 2022-09-16 RX ADMIN — BACLOFEN 20 MG: 10 TABLET ORAL at 10:08

## 2022-09-16 RX ADMIN — CEFAZOLIN 2 G: 1 INJECTION, POWDER, FOR SOLUTION INTRAMUSCULAR; INTRAVENOUS at 05:44

## 2022-09-16 RX ADMIN — CEFAZOLIN 2 G: 1 INJECTION, POWDER, FOR SOLUTION INTRAMUSCULAR; INTRAVENOUS at 14:57

## 2022-09-16 RX ADMIN — BACLOFEN 20 MG: 10 TABLET ORAL at 22:09

## 2022-09-16 RX ADMIN — OXYBUTYNIN CHLORIDE 10 MG: 5 TABLET, EXTENDED RELEASE ORAL at 10:08

## 2022-09-16 RX ADMIN — MIDODRINE HYDROCHLORIDE 10 MG: 5 TABLET ORAL at 10:09

## 2022-09-16 RX ADMIN — MIDODRINE HYDROCHLORIDE 10 MG: 5 TABLET ORAL at 18:20

## 2022-09-16 RX ADMIN — OXYCODONE HYDROCHLORIDE AND ACETAMINOPHEN 2 TABLET: 5; 325 TABLET ORAL at 11:49

## 2022-09-16 RX ADMIN — SODIUM CHLORIDE 75 ML/HR: 9 INJECTION, SOLUTION INTRAVENOUS at 01:46

## 2022-09-16 RX ADMIN — MORPHINE SULFATE 2 MG: 2 INJECTION, SOLUTION INTRAMUSCULAR; INTRAVENOUS at 10:13

## 2022-09-16 RX ADMIN — OXYCODONE HYDROCHLORIDE AND ACETAMINOPHEN 1 TABLET: 5; 325 TABLET ORAL at 05:44

## 2022-09-16 RX ADMIN — OXYCODONE HYDROCHLORIDE AND ACETAMINOPHEN 2 TABLET: 5; 325 TABLET ORAL at 16:27

## 2022-09-16 RX ADMIN — FAMOTIDINE 20 MG: 20 TABLET ORAL at 18:19

## 2022-09-16 RX ADMIN — FAMOTIDINE 20 MG: 20 TABLET ORAL at 10:07

## 2022-09-16 RX ADMIN — SODIUM CHLORIDE, PRESERVATIVE FREE 10 ML: 5 INJECTION INTRAVENOUS at 10:13

## 2022-09-16 RX ADMIN — ENOXAPARIN SODIUM 30 MG: 100 INJECTION SUBCUTANEOUS at 00:00

## 2022-09-16 RX ADMIN — OXYCODONE HYDROCHLORIDE AND ACETAMINOPHEN 1 TABLET: 5; 325 TABLET ORAL at 22:12

## 2022-09-16 RX ADMIN — BACLOFEN 20 MG: 10 TABLET ORAL at 18:20

## 2022-09-16 RX ADMIN — BACLOFEN 20 MG: 10 TABLET ORAL at 14:57

## 2022-09-16 NOTE — PROGRESS NOTES
ARU/IPR REFERRAL CONTACT NOTE  [id number] [place] [address] for Physical Rehabilitation    RE:  Suresh Boyce Thank you for the opportunity to review this patient's case for admission to [id number] [place] [address] for Physical Rehabilitation. Based on our pre-admission screening:     [ Milton Silk Team/Medical Director is following this case. Comments: Referral received. Following for potential IPR needs. Need PT/OT evaluations/updates and recommendations. Please be advised admission to ARU will be based on meeting admission requirements, authorization from [address] [place] [address]      Thank you for this referral.   Please do not hesitate to call if you need further information or have additional questions.      Regards,    Conrad Hoffman PTA   Admissions [facility] for Physical Rehabilitation  [phone]

## 2022-09-16 NOTE — ROUTINE PROCESS
Wound Prevention Checklist    Patient: Prema Damon (49 y.o. male)  Date: 9/16/2022  Diagnosis: Femur fracture, right ([address] [S72.91XA] Femur fracture, right ([address]    Precautions:         []  Heel prevention boots placed on patient    []  Patient turned q2h during shift    []  Lift team ordered    [x]  Patient on [place] bed/Specialty bed    [x]  Each Wound is documented during shift (Stage, Color, drainage, odor, measurements, and dressings)    [x]  Dual skin checks done at bedside during shift report with Fran Wilson RN

## 2022-09-16 NOTE — WOUND CARE
Physical Exam  Room 465: Focused wound assess  Discussed care with primary nurse Jazmín Watkins. Right ischium, chronic stage 4 pressure injury, 5x4x1.5cm, undermining noted 1 @ 3 o'clock, 0 @ 6 o'clock, 2 @ 9 o'clock, & 3cm @ 12 o'clock position, no bone directly palpable, white callous tissue on outer edges from 6 o'clock to 12 o'clock position, pink & white re-epithelialization noted from 6 to 8 o'clock position, base pink & yellow, periwound intact with slight hyperpigmentation, (+) odor noted but unsure when dressing was last changed, moderate to large amount brown black drainage noted on old dressing, POA. Sacrum/coccyx has 100% re-epithelialization noted, closed pressure injury, POA. Pt has external hemorrhoids visible. Wound Care Orders Right ischial: Unit nursing staff to cleanse wound with saline or wound spray from par room, apply Opticell AG gelling fiber dressing, rope/ribbon, cut dressing to fit, cover with Optifoam Gentle Silicone dressing, then cover with tegaderm dressing, change every 48hrs & prn soilage. Wound Care Orders for sacrum/coccyx: Unit nursing staff to cleanse wound with saline or wound spray from par room, pat dry, apply small amount & thin layer of zinc barrier cream from par room, cover with Optifoam Gentle Silicone dressing, then cover with tegaderm dressing, change every 48hrs & prn soilage. Topical prevention & treatment orders per nursing unit skin care protocol. Taught pt & family member in room how to gently pack dressing, signs of wound healing, & promotion of skin health. Pt on [place] bed. Both heels are intact except for right lateral callous, taught lotion application to dry skin. Will turn over care to nursing staff at this time.   [facility] Shaun BRAGGN, RN, [facility] [place], [address]
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
## 2022-09-16 NOTE — PROGRESS NOTES
[facility]  Progress Note    Patient: Junior Martin Age: 49 y.o. : 1975 MR#: [id number] SSN: [id number]  Date/Time: 2022     Subjective: Patient feels fine, complains of pain at the surgical site. No headache, no blurred vision, no dizziness. Assessment/Plan:     Right femur fracture status post nailing 9/15/2022  Paraplegia  History of right sacral decubitus ulcer stage IV  Neurogenic bladder  Wheelchair and bedbound status  Mild hypotension due to anesthetics/pain medications    Plan  We will continue IV hydration, midodrine and monitor blood pressure  If blood pressure improves, will wean off midodrine and hydration  Continue pain medications as tolerated  PT OT per Ortho team  DVT prophylaxis per Ortho    Discussed with the patient and also with mother at the bedside. Disposition: Home in next 1 to 2 days if remains stable    Case discussed with:  [x]Patient  [x]Family  []Nursing  []Case Management  DVT Prophylaxis:  [x]Lovenox  []Hep SQ  []SCDs  []Coumadin   []Eliquis/Xarelto     Objective:   VS: Visit Vitals  BP (!) 97/58   Pulse 78   Temp 98.5 °F (36.9 °C)   Resp 18   SpO2 98%      Tmax/24hrs: Temp (24hrs), Av.5 °F (36.9 °C), Min:98.4 °F (36.9 °C), Max:98.9 °F (37.2 °C)  IOBRIEF  Intake/Output Summary (Last 24 hours) at 2022 1752  Last data filed at 2022 1457  Gross per 24 hour   Intake 480 ml   Output 900 ml   Net -420 ml       General:  Alert, cooperative, no acute distress    HEENT: PERRLA, anicteric sclerae. Pulmonary:  CTA Bilaterally. No Wheezing/Rales. Cardiovascular: Regular rate and Rhythm. GI:  Soft, Non distended, Non tender. + Bowel sounds. Extremities: Right hip and thigh area dressing noted, mild swelling right ankle  Psych: Good insight. Not anxious or agitated. Neurologic: Alert and oriented X 3.   Contracted lower extremity, moves upper extremity  Additional: Diffuse muscle wasting    Medications:   Current Facility-Administered Medications   Medication Dose Route Frequency    sodium chloride (NS) flush 5-40 mL  5-40 mL IntraVENous Q8H    sodium chloride (NS) flush 5-40 mL  5-40 mL IntraVENous PRN    acetaminophen (TYLENOL) tablet 650 mg  650 mg Oral Q6H PRN    Or    acetaminophen (TYLENOL) suppository 650 mg  650 mg Rectal Q6H PRN    polyethylene glycol (MIRALAX) packet 17 g  17 g Oral DAILY PRN    ondansetron (ZOFRAN ODT) tablet 4 mg  4 mg Oral Q8H PRN    Or    ondansetron (ZOFRAN) injection 4 mg  4 mg IntraVENous Q6H PRN    melatonin tablet 5 mg  5 mg Oral QHS PRN    albuterol-ipratropium (DUO-NEB) 2.5 MG-0.5 MG/3 ML  3 mL Nebulization Q6H PRN    naloxone (NARCAN) injection 0.4 mg  0.4 mg IntraVENous EVERY 2 MINUTES AS NEEDED    baclofen (LIORESAL) tablet 20 mg  20 mg Oral QID    bisacodyL (DULCOLAX) suppository 10 mg  10 mg Rectal DAILY PRN    diazePAM (VALIUM) tablet 10 mg  10 mg Oral BID PRN    famotidine (PEPCID) tablet 20 mg  20 mg Oral BID    oxybutynin chloride XL (DITROPAN XL) tablet 10 mg  10 mg Oral DAILY    polyethylene glycol (MIRALAX) packet 17 g  17 g Oral DAILY    senna (SENOKOT) tablet 8.6 mg  1 Tablet Oral QHS    tamsulosin (FLOMAX) capsule 0.4 mg  0.4 mg Oral DAILY    enoxaparin (LOVENOX) injection 30 mg  30 mg SubCUTAneous Q24H    morphine injection 1-2 mg  1-2 mg IntraVENous Q4H PRN    oxyCODONE-acetaminophen (PERCOCET) 5-325 mg per tablet 1-2 Tablet  1-2 Tablet Oral Q4H PRN    midodrine (PROAMATINE) tablet 10 mg  10 mg Oral TID WITH MEALS    0.9% sodium chloride infusion  75 mL/hr IntraVENous CONTINUOUS       Labs:    Recent Results (from the past 24 hour(s))   METABOLIC PANEL, BASIC    Collection Time: 09/16/22 10:05 AM   Result Value Ref Range    Sodium 138 136 - 145 mmol/L    Potassium 3.8 3.5 - 5.5 mmol/L    Chloride 104 100 - 111 mmol/L    CO2 29 21 - 32 mmol/L    Anion gap 5 3.0 - 18 mmol/L    Glucose 113 (H) 74 - 99 mg/dL    BUN 8 7.0 - 18 MG/DL    Creatinine 0.37 (L) 0.6 - 1.3 MG/DL BUN/Creatinine ratio 22 (H) 12 - 20      GFR est AA >60 >60 ml/min/1.73m2    GFR est non-AA >60 >60 ml/min/1.73m2    Calcium 8.6 8.5 - 10.1 MG/DL   CBC WITH AUTOMATED DIFF    Collection Time: 09/16/22 10:05 AM   Result Value Ref Range    WBC 7.6 4.6 - 13.2 K/uL    RBC 2.84 (L) 4.35 - 5.65 M/uL    HGB 8.1 (L) 13.0 - 16.0 g/dL    HCT 25.4 (L) 36.0 - 48.0 %    MCV 89.4 78.0 - 100.0 FL    MCH 28.5 24.0 - 34.0 PG    MCHC 31.9 31.0 - 37.0 g/dL    RDW 14.5 11.6 - 14.5 %    PLATELET 360 594 - 590 K/uL    MPV 9.6 9.2 - 11.8 FL    NRBC 0.0 0  WBC    ABSOLUTE NRBC 0.00 0.00 - 0.01 K/uL    NEUTROPHILS 67 40 - 73 %    LYMPHOCYTES 26 21 - 52 %    MONOCYTES 7 3 - 10 %    EOSINOPHILS 0 0 - 5 %    BASOPHILS 0 0 - 2 %    IMMATURE GRANULOCYTES 0 0.0 - 0.5 %    ABS. NEUTROPHILS 5.0 1.8 - 8.0 K/UL    ABS. LYMPHOCYTES 1.9 0.9 - 3.6 K/UL    ABS. MONOCYTES 0.5 0.05 - 1.2 K/UL    ABS. EOSINOPHILS 0.0 0.0 - 0.4 K/UL    ABS. BASOPHILS 0.0 0.0 - 0.1 K/UL    ABS. IMM. GRANS. 0.0 0.00 - 0.04 K/UL    DF AUTOMATED         Signed By: Albino Hi MD     September 16, 2022      Disclaimer: Sections of this note are dictated using utilizing voice recognition software. Minor typographical errors may be present. If questions arise, please do not hesitate to contact me or call our department.

## 2022-09-16 NOTE — PROGRESS NOTES
.  Reason for Admission:  Femur fracture, right ([facility] [address] [S72.91XA]                 RUR Score:    16%            Plan for utilizing home health:    yes                      Likelihood of Readmission:   Moderate                         Do you (patient/family) have any concerns for transition/discharge?  no    Transition of Care Plan:       Initial assessment completed with patient. Cognitive status of patient: oriented to time, place, person and situation. Face sheet information confirmed:  yes. The patient designates Emanuel Murphy, mother ([phone] to participate in his discharge plan and to receive any needed information. This patient lives alone in a [address]. Patient is not able to navigate steps as needed. Prior to hospitalization, patient was considered to be independent with ADLs/IADLS : yes . Patient has a current ACP document on file: no    The patient n     The patient already has W/C available in the home. Patient is currently active with home health. If active, agency name is [facility]. Patient has stayed in a skilled nursing facility or rehab years ago. .      This patient is on dialysis :no      . Freedom of choice signed: yes, for [facility] and orders placed in [facility]    . Currently, the discharge plan is Home with [address] [place] [address] Dominick Knox. Patient is w/c bound. CM will continue to monitor and assist with transition of care needs. The patient states that he can obtain his medications from the pharmacy, and take his medications as directed. Patient's current insurance is [facility] medicare/[employer]na  and [facility] medicaid/A[employer]na [facility] medicaid      Care Management Interventions  PCP Verified by CM:  Yes  Mode of Transport at Discharge: BLS  Transition of Care Consult (CM Consult): Discharge Planning  Discharge Durable Medical Equipment: No  Physical Therapy Consult: Yes  Occupational Therapy Consult: Yes  Speech Therapy Consult: No  Support Systems: Other Family Member(s), Parent(s)  Confirm Follow Up Transport: Self  The Plan for Transition of Care is Related to the Following Treatment Goals : home health  The Patient and/or Patient Representative was Provided with a Choice of Provider and Agrees with the Discharge Plan?: Yes  Freedom of Choice List was Provided with Basic Dialogue that Supports the Patient's Individualized Plan of Care/Goals, Treatment Preferences and Shares the Quality Data Associated with the Providers?: Yes  Discharge Location  Patient Expects to be Discharged to[de-identified] Home with home health        MERLENE Kay, RN  Pager # [phone]  Care Manager

## 2022-09-16 NOTE — ROUTINE PROCESS
Bedside shift change report given to Christina Green RN (oncoming nurse) by Claude Han RN (offgoing nurse). Report included the following information SBAR, Kardex, Intake/Output, and Recent Results.

## 2022-09-16 NOTE — BRIEF OP NOTE
Brief Postoperative Note    Patient: Néstor Benson  YOB: 1975  MRN: [id number]    Date of Procedure: 9/15/2022     Pre-Op Diagnosis: RIGHT FEMUR FRACTURE    Post-Op Diagnosis: Same as preoperative diagnosis. Procedure(s):  RIGHT FEMUR RETROGRADE NAIL    Surgeon(s):  Rakesh Henning MD    Surgical Assistant: Surg Asst-1: Rebeka Hutton    Anesthesia: General     Estimated Blood Loss (mL): less than 50     Complications: None    Specimens: * No specimens in log *     Implants:   Implant Name Type Inv.  Item Serial No.  Lot No. LRB No. Used Action   NAIL IM L380MM [id number] UNIV FEM GRN TI [employer] - [id number]  NAIL IM L380MM [id number] UNIV FEM GRN TI [employer] [facility] [id number] Right 1 Implanted   SCREW BNE L36MM DIA5MM NONSTERILE TIB LT GRN TI [employer] - [id number]  SCREW BNE L36MM DIA5MM NONSTERILE TIB LT GRN TI [employer] [facility] [id number] Right 1 Implanted   SCREW BNE L60MM DIA5MM NONSTERILE TIB LT GRN TI [employer] - [id number]  SCREW BNE L60MM DIA5MM NONSTERILE TIB LT GRN TI [employer] [place] [id number] Right 1 Implanted   SCREW BNE L80MM DIA5MM NONSTERILE TIB LT GRN TI [employer] - [id number]  SCREW BNE L80MM DIA5MM NONSTERILE TIB LT GRN TI [employer] [place] [id number] Right 1 Implanted       Drains: * No LDAs found *    Findings: femur fracture    Electronically Signed by ALLISON Orellana on 9/15/2022 at 8:56 PM

## 2022-09-16 NOTE — PROGRESS NOTES
Problem: Falls - Risk of  Goal: *Absence of Falls  Description: Document [place] Fall Risk and appropriate interventions in the flowsheet.   Outcome: Progressing Towards Goal  Note: Fall Risk Interventions:            Medication Interventions: Bed/chair exit alarm    Elimination Interventions: Bed/chair exit alarm, Call light in reach              Problem: Patient Education: Go to Patient Education Activity  Goal: Patient/Family Education  Outcome: Progressing Towards Goal

## 2022-09-17 LAB
ERYTHROCYTE [DISTWIDTH] IN BLOOD BY AUTOMATED COUNT: 14.6 % (ref 11.6–14.5)
HCT VFR BLD AUTO: 22.1 % (ref 36–48)
HGB BLD-MCNC: 7 G/DL (ref 13–16)
HISTORY CHECKED?,CKHIST: NORMAL
MCH RBC QN AUTO: 28.5 PG (ref 24–34)
MCHC RBC AUTO-ENTMCNC: 31.7 G/DL (ref 31–37)
MCV RBC AUTO: 89.8 FL (ref 78–100)
NRBC # BLD: 0 K/UL (ref 0–0.01)
NRBC BLD-RTO: 0 PER 100 WBC
PLATELET # BLD AUTO: 348 K/UL (ref 135–420)
PMV BLD AUTO: 9.6 FL (ref 9.2–11.8)
RBC # BLD AUTO: 2.46 M/UL (ref 4.35–5.65)
WBC # BLD AUTO: 5.6 K/UL (ref 4.6–13.2)

## 2022-09-17 PROCEDURE — 74011250637 HC RX REV CODE- 250/637: Performed by: PHYSICIAN ASSISTANT

## 2022-09-17 PROCEDURE — 74011000250 HC RX REV CODE- 250: Performed by: PHYSICIAN ASSISTANT

## 2022-09-17 PROCEDURE — 30233N1 TRANSFUSION OF NONAUTOLOGOUS RED BLOOD CELLS INTO PERIPHERAL VEIN, PERCUTANEOUS APPROACH: ICD-10-PCS | Performed by: HOSPITALIST

## 2022-09-17 PROCEDURE — 36430 TRANSFUSION BLD/BLD COMPNT: CPT

## 2022-09-17 PROCEDURE — 74011250637 HC RX REV CODE- 250/637: Performed by: HOSPITALIST

## 2022-09-17 PROCEDURE — P9016 RBC LEUKOCYTES REDUCED: HCPCS

## 2022-09-17 PROCEDURE — 74011250636 HC RX REV CODE- 250/636: Performed by: PHYSICIAN ASSISTANT

## 2022-09-17 PROCEDURE — 36415 COLL VENOUS BLD VENIPUNCTURE: CPT

## 2022-09-17 PROCEDURE — 85027 COMPLETE CBC AUTOMATED: CPT

## 2022-09-17 PROCEDURE — 99232 SBSQ HOSP IP/OBS MODERATE 35: CPT | Performed by: HOSPITALIST

## 2022-09-17 PROCEDURE — 65270000029 HC RM PRIVATE

## 2022-09-17 RX ORDER — SODIUM CHLORIDE 9 MG/ML
250 INJECTION, SOLUTION INTRAVENOUS AS NEEDED
Status: DISCONTINUED | OUTPATIENT
Start: 2022-09-17 | End: 2022-09-19

## 2022-09-17 RX ADMIN — OXYCODONE HYDROCHLORIDE AND ACETAMINOPHEN 1 TABLET: 5; 325 TABLET ORAL at 09:37

## 2022-09-17 RX ADMIN — MIDODRINE HYDROCHLORIDE 10 MG: 5 TABLET ORAL at 08:13

## 2022-09-17 RX ADMIN — ENOXAPARIN SODIUM 30 MG: 100 INJECTION SUBCUTANEOUS at 00:45

## 2022-09-17 RX ADMIN — SODIUM CHLORIDE, PRESERVATIVE FREE 10 ML: 5 INJECTION INTRAVENOUS at 14:00

## 2022-09-17 RX ADMIN — BACLOFEN 20 MG: 10 TABLET ORAL at 09:27

## 2022-09-17 RX ADMIN — BACLOFEN 20 MG: 10 TABLET ORAL at 12:26

## 2022-09-17 RX ADMIN — OXYBUTYNIN CHLORIDE 10 MG: 5 TABLET, EXTENDED RELEASE ORAL at 09:28

## 2022-09-17 RX ADMIN — TAMSULOSIN HYDROCHLORIDE 0.4 MG: 0.4 CAPSULE ORAL at 09:27

## 2022-09-17 RX ADMIN — Medication 5 MG: at 22:04

## 2022-09-17 RX ADMIN — OXYCODONE HYDROCHLORIDE AND ACETAMINOPHEN 1 TABLET: 5; 325 TABLET ORAL at 13:26

## 2022-09-17 RX ADMIN — ENOXAPARIN SODIUM 30 MG: 100 INJECTION SUBCUTANEOUS at 23:35

## 2022-09-17 RX ADMIN — BACLOFEN 20 MG: 10 TABLET ORAL at 18:00

## 2022-09-17 RX ADMIN — OXYCODONE HYDROCHLORIDE AND ACETAMINOPHEN 1 TABLET: 5; 325 TABLET ORAL at 18:07

## 2022-09-17 RX ADMIN — SODIUM CHLORIDE, PRESERVATIVE FREE 10 ML: 5 INJECTION INTRAVENOUS at 22:05

## 2022-09-17 RX ADMIN — BACLOFEN 20 MG: 10 TABLET ORAL at 22:04

## 2022-09-17 RX ADMIN — FAMOTIDINE 20 MG: 20 TABLET ORAL at 17:23

## 2022-09-17 RX ADMIN — MIDODRINE HYDROCHLORIDE 10 MG: 5 TABLET ORAL at 12:26

## 2022-09-17 RX ADMIN — OXYCODONE HYDROCHLORIDE AND ACETAMINOPHEN 1 TABLET: 5; 325 TABLET ORAL at 22:04

## 2022-09-17 RX ADMIN — MIDODRINE HYDROCHLORIDE 10 MG: 5 TABLET ORAL at 17:00

## 2022-09-17 RX ADMIN — FAMOTIDINE 20 MG: 20 TABLET ORAL at 09:27

## 2022-09-17 NOTE — PROGRESS NOTES
Percocet 1 tab administered orally for complaint of right femur pain with pain 8/10 as stated by patient. Dressing dry and intact with patient reposition for comfort several times. 6570 Resting quietly with eyes closed. No complaints voiced of pain. 4083 Bedside and Verbal shift change report given to Lilli Marcelo (oncoming nurse) by Holly Guevara RN (offgoing nurse). Report given with SBAR, Kardex, Intake/Output, MAR and Recent Results.

## 2022-09-17 NOTE — PROGRESS NOTES
Problem: Falls - Risk of  Goal: *Absence of Falls  Description: Document Katt Escudero Fall Risk and appropriate interventions in the flowsheet.   Outcome: Progressing Towards Goal  Note: Fall Risk Interventions:            Medication Interventions: Bed/chair exit alarm    Elimination Interventions: Bed/chair exit alarm              Problem: Pain  Goal: *Control of Pain  Outcome: Progressing Towards Goal     Problem: Patient Education: Go to Patient Education Activity  Goal: Patient/Family Education  Outcome: Progressing Towards Goal     Problem: Patient Education: Go to Patient Education Activity  Goal: Patient/Family Education  Outcome: Progressing Towards Goal
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
## 2022-09-17 NOTE — PROGRESS NOTES
[facility]    Patient: Masoud Blake Age: 49 y.o. : 1975 MR#: [id number] SSN: [id number]  Date/Time: 2022     Subjective: Patient feels fine, complains of pain at the surgical site. No headache, no blurred vision, no dizziness. Assessment/Plan:     Right femur fracture status post nailing 9/15/2022  Paraplegia  History of right sacral decubitus ulcer stage IV  Neurogenic bladder  Wheelchair and bedbound status  Mild hypotension due to anesthetics/pain medications    Plan  Stop IVF, continue midodrine, continue to monitor blood pressure  Anemia secondary to surgery-we will transfuse 1 unit PRBC  Continue pain medications as tolerated  PT OT per Ortho team  DVT prophylaxis per Ortho    Discussed with the patient and also with mother at the bedside. Disposition: Home in next 1 to 2 days if remains stable    Case discussed with:  [x]Patient  [x]Family  []Nursing  []Case Management  DVT Prophylaxis:  [x]Lovenox  []Hep SQ  []SCDs  []Coumadin   []Eliquis/Xarelto     Objective:   VS: Visit Vitals  /70   Pulse 99   Temp 99.1 °F (37.3 °C)   Resp 20   SpO2 98%        Tmax/24hrs: Temp (24hrs), Av.4 °F (36.9 °C), Min:98 °F (36.7 °C), Max:99.1 °F (37.3 °C)  IOBRIEF  Intake/Output Summary (Last 24 hours) at 2022 1247  Last data filed at 2022 0946  Gross per 24 hour   Intake 1711.25 ml   Output 2675 ml   Net -963.75 ml         General:  Alert, cooperative, no acute distress    HEENT: PERRLA, anicteric sclerae. Pulmonary:  CTA Bilaterally. No Wheezing/Rales. Cardiovascular: Regular rate and Rhythm. GI:  Soft, Non distended, Non tender. + Bowel sounds. Extremities: Right hip and thigh area dressing noted, mild swelling right ankle  Psych: Good insight. Not anxious or agitated. Neurologic: Alert and oriented X 3.   Contracted lower extremity, moves upper extremity  Additional: Diffuse muscle wasting    Medications:   Current Facility-Administered Medications   Medication Dose Route Frequency    sodium chloride (NS) flush 5-40 mL  5-40 mL IntraVENous Q8H    sodium chloride (NS) flush 5-40 mL  5-40 mL IntraVENous PRN    acetaminophen (TYLENOL) tablet 650 mg  650 mg Oral Q6H PRN    Or    acetaminophen (TYLENOL) suppository 650 mg  650 mg Rectal Q6H PRN    polyethylene glycol (MIRALAX) packet 17 g  17 g Oral DAILY PRN    ondansetron (ZOFRAN ODT) tablet 4 mg  4 mg Oral Q8H PRN    Or    ondansetron (ZOFRAN) injection 4 mg  4 mg IntraVENous Q6H PRN    melatonin tablet 5 mg  5 mg Oral QHS PRN    albuterol-ipratropium (DUO-NEB) 2.5 MG-0.5 MG/3 ML  3 mL Nebulization Q6H PRN    naloxone (NARCAN) injection 0.4 mg  0.4 mg IntraVENous EVERY 2 MINUTES AS NEEDED    baclofen (LIORESAL) tablet 20 mg  20 mg Oral QID    bisacodyL (DULCOLAX) suppository 10 mg  10 mg Rectal DAILY PRN    diazePAM (VALIUM) tablet 10 mg  10 mg Oral BID PRN    famotidine (PEPCID) tablet 20 mg  20 mg Oral BID    oxybutynin chloride XL (DITROPAN XL) tablet 10 mg  10 mg Oral DAILY    polyethylene glycol (MIRALAX) packet 17 g  17 g Oral DAILY    senna (SENOKOT) tablet 8.6 mg  1 Tablet Oral QHS    tamsulosin (FLOMAX) capsule 0.4 mg  0.4 mg Oral DAILY    enoxaparin (LOVENOX) injection 30 mg  30 mg SubCUTAneous Q24H    morphine injection 1-2 mg  1-2 mg IntraVENous Q4H PRN    oxyCODONE-acetaminophen (PERCOCET) 5-325 mg per tablet 1-2 Tablet  1-2 Tablet Oral Q4H PRN    midodrine (PROAMATINE) tablet 10 mg  10 mg Oral TID WITH MEALS       Labs:    Recent Results (from the past 24 hour(s))   CBC W/O DIFF    Collection Time: 09/17/22  1:10 AM   Result Value Ref Range    WBC 5.6 4.6 - 13.2 K/uL    RBC 2.46 (L) 4.35 - 5.65 M/uL    HGB 7.0 (L) 13.0 - 16.0 g/dL    HCT 22.1 (L) 36.0 - 48.0 %    MCV 89.8 78.0 - 100.0 FL    MCH 28.5 24.0 - 34.0 PG    MCHC 31.7 31.0 - 37.0 g/dL    RDW 14.6 (H) 11.6 - 14.5 %    PLATELET 146 855 - 361 K/uL    MPV 9.6 9.2 - 11.8 FL    NRBC 0.0 0  WBC    ABSOLUTE NRBC 0.00 0.00 - 0.01 K/uL       Signed By: Jimi Cheema MD     September 17, 2022      Disclaimer: Sections of this note are dictated using utilizing voice recognition software. Minor typographical errors may be present. If questions arise, please do not hesitate to contact me or call our department.

## 2022-09-17 NOTE — PROGRESS NOTES
Problem: Falls - Risk of  Goal: *Absence of Falls  Description: Document Liliane Led Fall Risk and appropriate interventions in the flowsheet.   Outcome: Progressing Towards Goal  Note: Fall Risk Interventions:            Medication Interventions: Bed/chair exit alarm    Elimination Interventions: Bed/chair exit alarm, Call light in reach, Toileting schedule/hourly rounds              Problem: Patient Education: Go to Patient Education Activity  Goal: Patient/Family Education  Outcome: Progressing Towards Goal     Problem: Pain  Goal: *Control of Pain  Outcome: Progressing Towards Goal     Problem: Patient Education: Go to Patient Education Activity  Goal: Patient/Family Education  Outcome: Progressing Towards Goal

## 2022-09-17 NOTE — ROUTINE PROCESS
Bedside shift report received from [facility] with sbar  Appetite fair  0930 percocet given for pain  Patient sleeping  1330 percocet given for pain  Pain better  Dressing changed to right leg  1500 blood started  Patient sleeping  1815 percocet given for pain  Blood completed 1810  Bedside shift report given to SR. Del Toro with sbar and quyen

## 2022-09-18 LAB
ABO + RH BLD: NORMAL
BLD PROD TYP BPU: NORMAL
BLOOD GROUP ANTIBODIES SERPL: NORMAL
BPU ID: NORMAL
CALLED TO:,BCALL1: NORMAL
CROSSMATCH RESULT,%XM: NORMAL
ERYTHROCYTE [DISTWIDTH] IN BLOOD BY AUTOMATED COUNT: 15 % (ref 11.6–14.5)
HCT VFR BLD AUTO: 26.1 % (ref 36–48)
HGB BLD-MCNC: 8.6 G/DL (ref 13–16)
MCH RBC QN AUTO: 28.5 PG (ref 24–34)
MCHC RBC AUTO-ENTMCNC: 33 G/DL (ref 31–37)
MCV RBC AUTO: 86.4 FL (ref 78–100)
NRBC # BLD: 0 K/UL (ref 0–0.01)
NRBC BLD-RTO: 0 PER 100 WBC
PLATELET # BLD AUTO: 378 K/UL (ref 135–420)
PMV BLD AUTO: 9.6 FL (ref 9.2–11.8)
RBC # BLD AUTO: 3.02 M/UL (ref 4.35–5.65)
SPECIMEN EXP DATE BLD: NORMAL
STATUS OF UNIT,%ST: NORMAL
UNIT DIVISION, %UDIV: 0
WBC # BLD AUTO: 7 K/UL (ref 4.6–13.2)

## 2022-09-18 PROCEDURE — 74011250637 HC RX REV CODE- 250/637: Performed by: PHYSICIAN ASSISTANT

## 2022-09-18 PROCEDURE — 65270000029 HC RM PRIVATE

## 2022-09-18 PROCEDURE — 36415 COLL VENOUS BLD VENIPUNCTURE: CPT

## 2022-09-18 PROCEDURE — 74011000250 HC RX REV CODE- 250: Performed by: PHYSICIAN ASSISTANT

## 2022-09-18 PROCEDURE — 85027 COMPLETE CBC AUTOMATED: CPT

## 2022-09-18 PROCEDURE — 74011250637 HC RX REV CODE- 250/637: Performed by: HOSPITALIST

## 2022-09-18 PROCEDURE — 74011250636 HC RX REV CODE- 250/636: Performed by: PHYSICIAN ASSISTANT

## 2022-09-18 PROCEDURE — 99232 SBSQ HOSP IP/OBS MODERATE 35: CPT | Performed by: HOSPITALIST

## 2022-09-18 RX ADMIN — ENOXAPARIN SODIUM 30 MG: 100 INJECTION SUBCUTANEOUS at 23:57

## 2022-09-18 RX ADMIN — BACLOFEN 20 MG: 10 TABLET ORAL at 21:03

## 2022-09-18 RX ADMIN — FAMOTIDINE 20 MG: 20 TABLET ORAL at 09:09

## 2022-09-18 RX ADMIN — SODIUM CHLORIDE, PRESERVATIVE FREE 10 ML: 5 INJECTION INTRAVENOUS at 06:50

## 2022-09-18 RX ADMIN — FAMOTIDINE 20 MG: 20 TABLET ORAL at 17:24

## 2022-09-18 RX ADMIN — BACLOFEN 20 MG: 10 TABLET ORAL at 18:00

## 2022-09-18 RX ADMIN — Medication 5 MG: at 21:03

## 2022-09-18 RX ADMIN — MIDODRINE HYDROCHLORIDE 10 MG: 5 TABLET ORAL at 11:58

## 2022-09-18 RX ADMIN — TAMSULOSIN HYDROCHLORIDE 0.4 MG: 0.4 CAPSULE ORAL at 09:09

## 2022-09-18 RX ADMIN — OXYBUTYNIN CHLORIDE 10 MG: 5 TABLET, EXTENDED RELEASE ORAL at 09:09

## 2022-09-18 RX ADMIN — OXYCODONE HYDROCHLORIDE AND ACETAMINOPHEN 1 TABLET: 5; 325 TABLET ORAL at 21:03

## 2022-09-18 RX ADMIN — BACLOFEN 20 MG: 10 TABLET ORAL at 11:58

## 2022-09-18 RX ADMIN — OXYCODONE HYDROCHLORIDE AND ACETAMINOPHEN 1 TABLET: 5; 325 TABLET ORAL at 08:28

## 2022-09-18 RX ADMIN — BACLOFEN 20 MG: 10 TABLET ORAL at 09:09

## 2022-09-18 RX ADMIN — MIDODRINE HYDROCHLORIDE 10 MG: 5 TABLET ORAL at 08:08

## 2022-09-18 RX ADMIN — SODIUM CHLORIDE, PRESERVATIVE FREE 10 ML: 5 INJECTION INTRAVENOUS at 21:04
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
## 2022-09-18 NOTE — ROUTINE PROCESS
Bedside shift report received from Sr. Del Toro with sbar  0830 percocet given for pain  Pain level inproved  Dr. Dian Shaikh in to see patient  Dressing changed to right ischeal wound,no drainage noted but has mild odor. Dr. Nunez Ranks made aware. Base of wound pink  Bedside shift report given to Sr. Del Toro with sbar and kardex

## 2022-09-18 NOTE — PROGRESS NOTES
Bedside and Verbal shift change report given to [address] (oncoming nurse) by Minnie Damian RN (offgoing nurse). Report included the following information SBAR, Kardex, Intake/Output, and MAR.

## 2022-09-18 NOTE — PROGRESS NOTES
Bedside and Verbal shift change report given to [address] [place] [address] Road  (oncoming nurse) by Luly Barclay RN (offgoing nurse). Report included the following information SBAR, Kardex, Intake/Output, and MAR.

## 2022-09-19 LAB
GLUCOSE BLD STRIP.AUTO-MCNC: 127 MG/DL (ref 70–110)
GLUCOSE BLD STRIP.AUTO-MCNC: 89 MG/DL (ref 70–110)

## 2022-09-19 PROCEDURE — 97535 SELF CARE MNGMENT TRAINING: CPT

## 2022-09-19 PROCEDURE — 97110 THERAPEUTIC EXERCISES: CPT

## 2022-09-19 PROCEDURE — 97530 THERAPEUTIC ACTIVITIES: CPT

## 2022-09-19 PROCEDURE — 99232 SBSQ HOSP IP/OBS MODERATE 35: CPT | Performed by: HOSPITALIST

## 2022-09-19 PROCEDURE — 74011250637 HC RX REV CODE- 250/637: Performed by: PHYSICIAN ASSISTANT

## 2022-09-19 PROCEDURE — 97162 PT EVAL MOD COMPLEX 30 MIN: CPT

## 2022-09-19 PROCEDURE — 82962 GLUCOSE BLOOD TEST: CPT

## 2022-09-19 PROCEDURE — 74011250637 HC RX REV CODE- 250/637: Performed by: HOSPITALIST

## 2022-09-19 PROCEDURE — 97166 OT EVAL MOD COMPLEX 45 MIN: CPT

## 2022-09-19 PROCEDURE — 74011000250 HC RX REV CODE- 250: Performed by: PHYSICIAN ASSISTANT

## 2022-09-19 PROCEDURE — 65270000029 HC RM PRIVATE

## 2022-09-19 RX ORDER — MORPHINE SULFATE 2 MG/ML
1 INJECTION, SOLUTION INTRAMUSCULAR; INTRAVENOUS
Status: DISCONTINUED | OUTPATIENT
Start: 2022-09-19 | End: 2022-09-23 | Stop reason: HOSPADM

## 2022-09-19 RX ORDER — MIDODRINE HYDROCHLORIDE 5 MG/1
5 TABLET ORAL
Status: DISCONTINUED | OUTPATIENT
Start: 2022-09-19 | End: 2022-09-20

## 2022-09-19 RX ADMIN — MIDODRINE HYDROCHLORIDE 10 MG: 5 TABLET ORAL at 11:16

## 2022-09-19 RX ADMIN — FAMOTIDINE 20 MG: 20 TABLET ORAL at 19:00

## 2022-09-19 RX ADMIN — OXYBUTYNIN CHLORIDE 10 MG: 5 TABLET, EXTENDED RELEASE ORAL at 11:15

## 2022-09-19 RX ADMIN — MIDODRINE HYDROCHLORIDE 5 MG: 5 TABLET ORAL at 19:00

## 2022-09-19 RX ADMIN — MIDODRINE HYDROCHLORIDE 5 MG: 5 TABLET ORAL at 15:57

## 2022-09-19 RX ADMIN — SODIUM CHLORIDE, PRESERVATIVE FREE 10 ML: 5 INJECTION INTRAVENOUS at 15:59

## 2022-09-19 RX ADMIN — BACLOFEN 20 MG: 10 TABLET ORAL at 21:40

## 2022-09-19 RX ADMIN — SENNOSIDES 8.6 MG: 8.6 TABLET, FILM COATED ORAL at 21:41

## 2022-09-19 RX ADMIN — BACLOFEN 20 MG: 10 TABLET ORAL at 11:13

## 2022-09-19 RX ADMIN — SODIUM CHLORIDE, PRESERVATIVE FREE 10 ML: 5 INJECTION INTRAVENOUS at 23:30

## 2022-09-19 RX ADMIN — OXYCODONE HYDROCHLORIDE AND ACETAMINOPHEN 2 TABLET: 5; 325 TABLET ORAL at 15:35

## 2022-09-19 RX ADMIN — BACLOFEN 20 MG: 10 TABLET ORAL at 15:35

## 2022-09-19 RX ADMIN — FAMOTIDINE 20 MG: 20 TABLET ORAL at 11:15

## 2022-09-19 RX ADMIN — BACLOFEN 20 MG: 10 TABLET ORAL at 19:00

## 2022-09-19 RX ADMIN — OXYCODONE HYDROCHLORIDE AND ACETAMINOPHEN 1 TABLET: 5; 325 TABLET ORAL at 21:41

## 2022-09-19 RX ADMIN — TAMSULOSIN HYDROCHLORIDE 0.4 MG: 0.4 CAPSULE ORAL at 11:15

## 2022-09-19 RX ADMIN — SODIUM CHLORIDE, PRESERVATIVE FREE 10 ML: 5 INJECTION INTRAVENOUS at 05:30

## 2022-09-19 RX ADMIN — OXYCODONE HYDROCHLORIDE AND ACETAMINOPHEN 2 TABLET: 5; 325 TABLET ORAL at 11:27
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
## 2022-09-19 NOTE — PROGRESS NOTES
[facility]  Progress Note    Patient: Cindi Maldonado Age: 49 y.o. : 1975 MR#: [id number] SSN: [id number]  Date/Time: 2022     Subjective: Patient feels fine, no new complaints, pain well controlled. Assessment/Plan:     Right femur fracture status post nailing 9/15/2022  Paraplegia  History of right sacral decubitus ulcer stage IV  Neurogenic bladder  Wheelchair and bedbound status  Mild hypotension       Plan  BP more stable, will decrease midodrine and monitor  Continue pain medications as tolerated  Will consult PT/OT eval and treatment  DVT prophylaxis per Ortho    Discussed with the patient and also with sister at the bedside. Discussed about her discharge plan options, patient would like to go to the rehab/SNF. He does not have anybody to take care of him at home. Disposition: SNF once bed available    Addendum  Last blood pressure recorded 83/56  Discussed with RN to repeat blood pressure, if remains low will increase midodrine. Case discussed with:  [x]Patient  [x]Family  []Nursing  []Case Management  DVT Prophylaxis:  [x]Lovenox  []Hep SQ  []SCDs  []Coumadin   []Eliquis/Xarelto     Objective:   VS: Visit Vitals  BP (!) 83/56   Pulse 83   Temp 98.4 °F (36.9 °C)   Resp 16   SpO2 99%      Tmax/24hrs: Temp (24hrs), Av.3 °F (36.8 °C), Min:97.3 °F (36.3 °C), Max:99.5 °F (37.5 °C)  IOBRIEF  Intake/Output Summary (Last 24 hours) at 2022 1700  Last data filed at 2022 0427  Gross per 24 hour   Intake 1500 ml   Output 3500 ml   Net -2000 ml       General:  Alert, cooperative, no acute distress    Pulmonary:  CTA Bilaterally. No Wheezing/Rales. Cardiovascular: Regular rate and Rhythm. GI:  Soft, Non distended, Non tender. + Bowel sounds. Extremities: Right hip and thigh area dressing noted  Psych: Good insight. Not anxious or agitated. Neurologic: Alert and oriented X 3.   Contracted lower extremity, moves upper extremity  Additional: Diffuse muscle wasting    Medications:   Current Facility-Administered Medications   Medication Dose Route Frequency    midodrine (PROAMATINE) tablet 5 mg  5 mg Oral TID WITH MEALS    morphine injection 1 mg  1 mg IntraVENous Q8H PRN    sodium chloride (NS) flush 5-40 mL  5-40 mL IntraVENous Q8H    sodium chloride (NS) flush 5-40 mL  5-40 mL IntraVENous PRN    acetaminophen (TYLENOL) tablet 650 mg  650 mg Oral Q6H PRN    Or    acetaminophen (TYLENOL) suppository 650 mg  650 mg Rectal Q6H PRN    polyethylene glycol (MIRALAX) packet 17 g  17 g Oral DAILY PRN    ondansetron (ZOFRAN ODT) tablet 4 mg  4 mg Oral Q8H PRN    Or    ondansetron (ZOFRAN) injection 4 mg  4 mg IntraVENous Q6H PRN    melatonin tablet 5 mg  5 mg Oral QHS PRN    albuterol-ipratropium (DUO-NEB) 2.5 MG-0.5 MG/3 ML  3 mL Nebulization Q6H PRN    naloxone (NARCAN) injection 0.4 mg  0.4 mg IntraVENous EVERY 2 MINUTES AS NEEDED    baclofen (LIORESAL) tablet 20 mg  20 mg Oral QID    bisacodyL (DULCOLAX) suppository 10 mg  10 mg Rectal DAILY PRN    diazePAM (VALIUM) tablet 10 mg  10 mg Oral BID PRN    famotidine (PEPCID) tablet 20 mg  20 mg Oral BID    oxybutynin chloride XL (DITROPAN XL) tablet 10 mg  10 mg Oral DAILY    polyethylene glycol (MIRALAX) packet 17 g  17 g Oral DAILY    senna (SENOKOT) tablet 8.6 mg  1 Tablet Oral QHS    tamsulosin (FLOMAX) capsule 0.4 mg  0.4 mg Oral DAILY    enoxaparin (LOVENOX) injection 30 mg  30 mg SubCUTAneous Q24H    oxyCODONE-acetaminophen (PERCOCET) 5-325 mg per tablet 1-2 Tablet  1-2 Tablet Oral Q4H PRN       Labs:    Recent Results (from the past 24 hour(s))   GLUCOSE, POC    Collection Time: 09/19/22  7:51 AM   Result Value Ref Range    Glucose (POC) 89 70 - 110 mg/dL   GLUCOSE, POC    Collection Time: 09/19/22 11:28 AM   Result Value Ref Range    Glucose (POC) 127 (H) 70 - 110 mg/dL       Signed By: Alvaro Pino MD     September 19, 2022      Disclaimer: Sections of this note are dictated using utilizing voice recognition software. Minor typographical errors may be present. If questions arise, please do not hesitate to contact me or call our department.

## 2022-09-19 NOTE — PROGRESS NOTES
Problem: Falls - Risk of  Goal: *Absence of Falls  Description: Document Jesusa Bare Fall Risk and appropriate interventions in the flowsheet. Outcome: Progressing Towards Goal  Note: Fall Risk Interventions:            Medication Interventions: Bed/chair exit alarm    Elimination Interventions: Call light in reach, Patient to call for help with toileting needs    History of Falls Interventions: Bed/chair exit alarm         Problem: Patient Education: Go to Patient Education Activity  Goal: Patient/Family Education  Outcome: Progressing Towards Goal     Problem: Pain  Goal: *Control of Pain  Outcome: Progressing Towards Goal     Problem: Patient Education: Go to Patient Education Activity  Goal: Patient/Family Education  Outcome: Progressing Towards Goal     Problem: Pressure Injury - Risk of  Goal: *Prevention of pressure injury  Description: Document Leander Scale and appropriate interventions in the flowsheet.   Outcome: Progressing Towards Goal  Note: Pressure Injury Interventions:  Sensory Interventions: Minimize linen layers    Moisture Interventions: Absorbent underpads    Activity Interventions: Pressure redistribution bed/mattress(bed type)    Mobility Interventions: Pressure redistribution bed/mattress (bed type)    Nutrition Interventions: Document food/fluid/supplement intake    Friction and Shear Interventions: Minimize layers                Problem: Patient Education: Go to Patient Education Activity  Goal: Patient/Family Education  Outcome: Progressing Towards Goal     Problem: Patient Education: Go to Patient Education Activity  Goal: Patient/Family Education  Outcome: Progressing Towards Goal     Problem: Patient Education: Go to Patient Education Activity  Goal: Patient/Family Education  Outcome: Progressing Towards Goal

## 2022-09-19 NOTE — PROGRESS NOTES
[facility] Hospitalist Group  Progress Note    Patient: Tara Saba Age: 49 y.o. : 1975 MR#: [id number] SSN: [id number]  Date/Time: 2022     Subjective: Patient feels fine, complains of pain at the surgical site. No headache, no blurred vision, no dizziness. Assessment/Plan:     Right femur fracture status post nailing 9/15/2022  Paraplegia  History of right sacral decubitus ulcer stage IV  Neurogenic bladder  Wheelchair and bedbound status  Mild hypotension due to anesthetics/pain medications    Plan  Stop IVF, continue midodrine, continue to monitor blood pressure  Anemia secondary to surgery-we will transfuse 1 unit PRBC  Continue pain medications as tolerated  PT OT per Ortho team  DVT prophylaxis per Ortho    -patient transfused 1 unit PRBC on . Hemoglobin has improved. Per case management note patient was to be discharged home however at this time patient mentions that he does not feel comfortable going home since he will not have any support and would like to be discharged to skilled nursing facility. Disposition: Home in next 1 to 2 days if remains stable    Case discussed with:  [x]Patient  [x]Family  []Nursing  []Case Management  DVT Prophylaxis:  [x]Lovenox  []Hep SQ  []SCDs  []Coumadin   []Eliquis/Xarelto     Objective:   VS: Visit Vitals  /70 (BP 1 Location: Right upper arm)   Pulse 80   Temp 98.5 °F (36.9 °C)   Resp 16   SpO2 97%        Tmax/24hrs: Temp (24hrs), Av.4 °F (36.9 °C), Min:98.1 °F (36.7 °C), Max:98.5 °F (36.9 °C)  IOBRIEF  Intake/Output Summary (Last 24 hours) at 2022  Last data filed at 2022 1850  Gross per 24 hour   Intake 1500 ml   Output 3700 ml   Net -2200 ml         General:  Alert, cooperative, no acute distress    HEENT: PERRLA, anicteric sclerae. Pulmonary:  CTA Bilaterally. No Wheezing/Rales. Cardiovascular: Regular rate and Rhythm.   GI:  Soft, Non distended, Non tender. + Bowel sounds. Extremities: Right hip and thigh area dressing noted, mild swelling right ankle  Psych: Good insight. Not anxious or agitated. Neurologic: Alert and oriented X 3.   Contracted lower extremity, moves upper extremity  Additional: Diffuse muscle wasting    Medications:   Current Facility-Administered Medications   Medication Dose Route Frequency    0.9% sodium chloride infusion 250 mL  250 mL IntraVENous PRN    sodium chloride (NS) flush 5-40 mL  5-40 mL IntraVENous Q8H    sodium chloride (NS) flush 5-40 mL  5-40 mL IntraVENous PRN    acetaminophen (TYLENOL) tablet 650 mg  650 mg Oral Q6H PRN    Or    acetaminophen (TYLENOL) suppository 650 mg  650 mg Rectal Q6H PRN    polyethylene glycol (MIRALAX) packet 17 g  17 g Oral DAILY PRN    ondansetron (ZOFRAN ODT) tablet 4 mg  4 mg Oral Q8H PRN    Or    ondansetron (ZOFRAN) injection 4 mg  4 mg IntraVENous Q6H PRN    melatonin tablet 5 mg  5 mg Oral QHS PRN    albuterol-ipratropium (DUO-NEB) 2.5 MG-0.5 MG/3 ML  3 mL Nebulization Q6H PRN    naloxone (NARCAN) injection 0.4 mg  0.4 mg IntraVENous EVERY 2 MINUTES AS NEEDED    baclofen (LIORESAL) tablet 20 mg  20 mg Oral QID    bisacodyL (DULCOLAX) suppository 10 mg  10 mg Rectal DAILY PRN    diazePAM (VALIUM) tablet 10 mg  10 mg Oral BID PRN    famotidine (PEPCID) tablet 20 mg  20 mg Oral BID    oxybutynin chloride XL (DITROPAN XL) tablet 10 mg  10 mg Oral DAILY    polyethylene glycol (MIRALAX) packet 17 g  17 g Oral DAILY    senna (SENOKOT) tablet 8.6 mg  1 Tablet Oral QHS    tamsulosin (FLOMAX) capsule 0.4 mg  0.4 mg Oral DAILY    enoxaparin (LOVENOX) injection 30 mg  30 mg SubCUTAneous Q24H    morphine injection 1-2 mg  1-2 mg IntraVENous Q4H PRN    oxyCODONE-acetaminophen (PERCOCET) 5-325 mg per tablet 1-2 Tablet  1-2 Tablet Oral Q4H PRN    midodrine (PROAMATINE) tablet 10 mg  10 mg Oral TID WITH MEALS       Labs:    Recent Results (from the past 24 hour(s))   CBC W/O DIFF    Collection Time: 09/18/22  3:37 AM Result Value Ref Range    WBC 7.0 4.6 - 13.2 K/uL    RBC 3.02 (L) 4.35 - 5.65 M/uL    HGB 8.6 (L) 13.0 - 16.0 g/dL    HCT 26.1 (L) 36.0 - 48.0 %    MCV 86.4 78.0 - 100.0 FL    MCH 28.5 24.0 - 34.0 PG    MCHC 33.0 31.0 - 37.0 g/dL    RDW 15.0 (H) 11.6 - 14.5 %    PLATELET 976 078 - 085 K/uL    MPV 9.6 9.2 - 11.8 FL    NRBC 0.0 0  WBC    ABSOLUTE NRBC 0.00 0.00 - 0.01 K/uL       Signed By: Demetri Rodriguez MD     September 18, 2022      Disclaimer: Sections of this note are dictated using utilizing voice recognition software. Minor typographical errors may be present. If questions arise, please do not hesitate to contact me or call our department.

## 2022-09-20 PROCEDURE — 74011250637 HC RX REV CODE- 250/637: Performed by: PHYSICIAN ASSISTANT

## 2022-09-20 PROCEDURE — 74011250636 HC RX REV CODE- 250/636: Performed by: PHYSICIAN ASSISTANT

## 2022-09-20 PROCEDURE — 74011000250 HC RX REV CODE- 250: Performed by: PHYSICIAN ASSISTANT

## 2022-09-20 PROCEDURE — 74011250637 HC RX REV CODE- 250/637: Performed by: HOSPITALIST

## 2022-09-20 PROCEDURE — 99232 SBSQ HOSP IP/OBS MODERATE 35: CPT | Performed by: HOSPITALIST

## 2022-09-20 PROCEDURE — 65270000029 HC RM PRIVATE

## 2022-09-20 RX ORDER — PEDI MULTIVIT 158/IRON/VIT K1 18MG-10MCG
1 TABLET,CHEWABLE ORAL DAILY
Status: DISCONTINUED | OUTPATIENT
Start: 2022-09-21 | End: 2022-09-23 | Stop reason: HOSPADM

## 2022-09-20 RX ORDER — MIDODRINE HYDROCHLORIDE 5 MG/1
5 TABLET ORAL 2 TIMES DAILY WITH MEALS
Status: DISCONTINUED | OUTPATIENT
Start: 2022-09-20 | End: 2022-09-22

## 2022-09-20 RX ADMIN — MIDODRINE HYDROCHLORIDE 5 MG: 5 TABLET ORAL at 16:00

## 2022-09-20 RX ADMIN — ENOXAPARIN SODIUM 30 MG: 100 INJECTION SUBCUTANEOUS at 00:33

## 2022-09-20 RX ADMIN — FAMOTIDINE 20 MG: 20 TABLET ORAL at 18:42

## 2022-09-20 RX ADMIN — BACLOFEN 20 MG: 10 TABLET ORAL at 18:41

## 2022-09-20 RX ADMIN — OXYCODONE HYDROCHLORIDE AND ACETAMINOPHEN 2 TABLET: 5; 325 TABLET ORAL at 10:06

## 2022-09-20 RX ADMIN — FAMOTIDINE 20 MG: 20 TABLET ORAL at 10:05

## 2022-09-20 RX ADMIN — SENNOSIDES 8.6 MG: 8.6 TABLET, FILM COATED ORAL at 21:42

## 2022-09-20 RX ADMIN — BACLOFEN 20 MG: 10 TABLET ORAL at 21:42

## 2022-09-20 RX ADMIN — ENOXAPARIN SODIUM 30 MG: 100 INJECTION SUBCUTANEOUS at 23:45

## 2022-09-20 RX ADMIN — OXYCODONE HYDROCHLORIDE AND ACETAMINOPHEN 2 TABLET: 5; 325 TABLET ORAL at 21:43

## 2022-09-20 RX ADMIN — BACLOFEN 20 MG: 10 TABLET ORAL at 10:05

## 2022-09-20 RX ADMIN — OXYCODONE HYDROCHLORIDE AND ACETAMINOPHEN 1 TABLET: 5; 325 TABLET ORAL at 02:16

## 2022-09-20 RX ADMIN — SODIUM CHLORIDE, PRESERVATIVE FREE 10 ML: 5 INJECTION INTRAVENOUS at 14:00

## 2022-09-20 RX ADMIN — SODIUM CHLORIDE, PRESERVATIVE FREE 10 ML: 5 INJECTION INTRAVENOUS at 06:53

## 2022-09-20 RX ADMIN — SODIUM CHLORIDE, PRESERVATIVE FREE 10 ML: 5 INJECTION INTRAVENOUS at 21:42

## 2022-09-20 RX ADMIN — BACLOFEN 20 MG: 10 TABLET ORAL at 16:02

## 2022-09-20 RX ADMIN — OXYCODONE HYDROCHLORIDE AND ACETAMINOPHEN 2 TABLET: 5; 325 TABLET ORAL at 16:02

## 2022-09-20 RX ADMIN — MIDODRINE HYDROCHLORIDE 5 MG: 5 TABLET ORAL at 10:05

## 2022-09-20 RX ADMIN — MIDODRINE HYDROCHLORIDE 5 MG: 5 TABLET ORAL at 12:00

## 2022-09-20 RX ADMIN — OXYBUTYNIN CHLORIDE 10 MG: 5 TABLET, EXTENDED RELEASE ORAL at 10:05

## 2022-09-20 RX ADMIN — TAMSULOSIN HYDROCHLORIDE 0.4 MG: 0.4 CAPSULE ORAL at 10:05

## 2022-09-20 NOTE — PROGRESS NOTES
[facility]    Patient: Jewel Gonzalez Age: 49 y.o. : 1975 MR#: [id number]: [id number]  Date/Time: 2022     Subjective: Patient feels fine, no new complaints, pain well controlled. Assessment/Plan:     Right femur fracture status post nailing 9/15/2022  Paraplegia  History of right sacral decubitus ulcer stage IV  Neurogenic bladder  Wheelchair and bedbound status  Mild hypotension       Plan  BP more stable, will decrease midodrine and monitor  Continue pain medications as tolerated  Continue PT/OT eval and treatment  DVT prophylaxis per Ortho    Discussed with the patient at the bedside. Disposition: SNF once bed available      Case discussed with:  [x]Patient  []Family  []Nursing  []Case Management  DVT Prophylaxis:  [x]Lovenox  []Hep SQ  []SCDs  []Coumadin   []Eliquis/Xarelto     Objective:   VS: Visit Vitals  /70 (BP 1 Location: Right upper arm, BP Patient Position: At rest)   Pulse 80   Temp 97.6 °F (36.4 °C)   Resp 19   Ht 5' 9\" (1.753 m)   Wt 52.6 kg (116 lb)   SpO2 92%   BMI 17.13 kg/m²      Tmax/24hrs: Temp (24hrs), Av °F (36.7 °C), Min:97.4 °F (36.3 °C), Max:98.3 °F (36.8 °C)  IOBRIEF  Intake/Output Summary (Last 24 hours) at 2022 1711  Last data filed at 2022 1609  Gross per 24 hour   Intake 1260 ml   Output 1500 ml   Net -240 ml       General:  Alert, cooperative, no acute distress    Pulmonary:  CTA Bilaterally. No Wheezing/Rales. Cardiovascular: Regular rate and Rhythm. GI:  Soft, Non distended, Non tender. + Bowel sounds. Extremities: Right hip and thigh area dressing noted  Psych: Good insight. Not anxious or agitated. Neurologic: Alert and oriented X 3.   Contracted lower extremity, moves upper extremity  Additional: Diffuse muscle wasting    Medications:   Current Facility-Administered Medications   Medication Dose Route Frequency    midodrine (PROAMATINE) tablet 5 mg  5 mg Oral BID WITH MEALS [START ON 9/21/2022] flintstones complete (FLINTSTONES) chewable tablet 1 Tablet  1 Tablet Oral DAILY    [Held by provider] morphine injection 1 mg  1 mg IntraVENous Q8H PRN    sodium chloride (NS) flush 5-40 mL  5-40 mL IntraVENous Q8H    sodium chloride (NS) flush 5-40 mL  5-40 mL IntraVENous PRN    acetaminophen (TYLENOL) tablet 650 mg  650 mg Oral Q6H PRN    Or    acetaminophen (TYLENOL) suppository 650 mg  650 mg Rectal Q6H PRN    polyethylene glycol (MIRALAX) packet 17 g  17 g Oral DAILY PRN    ondansetron (ZOFRAN ODT) tablet 4 mg  4 mg Oral Q8H PRN    Or    ondansetron (ZOFRAN) injection 4 mg  4 mg IntraVENous Q6H PRN    melatonin tablet 5 mg  5 mg Oral QHS PRN    albuterol-ipratropium (DUO-NEB) 2.5 MG-0.5 MG/3 ML  3 mL Nebulization Q6H PRN    naloxone (NARCAN) injection 0.4 mg  0.4 mg IntraVENous EVERY 2 MINUTES AS NEEDED    baclofen (LIORESAL) tablet 20 mg  20 mg Oral QID    bisacodyL (DULCOLAX) suppository 10 mg  10 mg Rectal DAILY PRN    diazePAM (VALIUM) tablet 10 mg  10 mg Oral BID PRN    famotidine (PEPCID) tablet 20 mg  20 mg Oral BID    oxybutynin chloride XL (DITROPAN XL) tablet 10 mg  10 mg Oral DAILY    polyethylene glycol (MIRALAX) packet 17 g  17 g Oral DAILY    senna (SENOKOT) tablet 8.6 mg  1 Tablet Oral QHS    tamsulosin (FLOMAX) capsule 0.4 mg  0.4 mg Oral DAILY    enoxaparin (LOVENOX) injection 30 mg  30 mg SubCUTAneous Q24H    oxyCODONE-acetaminophen (PERCOCET) 5-325 mg per tablet 1-2 Tablet  1-2 Tablet Oral Q4H PRN       Labs:    No results found for this or any previous visit (from the past 24 hour(s)). Signed By: Isaias Miles MD     September 20, 2022      Disclaimer: Sections of this note are dictated using utilizing voice recognition software. Minor typographical errors may be present. If questions arise, please do not hesitate to contact me or call our department.

## 2022-09-20 NOTE — PROGRESS NOTES
CM talked  pt and pt's mother at bedside to discuss pt's transitional plan of care of SNF. Pt provided verbal consent to begin insurance authorization for [facility]. Pt stated is unvaccinated. 5- CM notified Lulu Tom with [facility] to begin insurance authorization for SNF via [place].               Kathya Anna MSW  Care Manager

## 2022-09-20 NOTE — PROGRESS NOTES
09/20/22 1006   Pain 1   Pain Scale 1 Numeric (0 - 10)   Pain Intensity 1 8   Patient Stated Pain Goal 0   Pain Reassessment 1 Patient resting w/respiratory rate greater than 10   Pain Onset 1 acute   Pain Location 1 Generalized;Knee   Pain Orientation 1 Anterior   Pain Description 1 Aching   Pain Intervention(s) 1 Medication (see MAR)   Medicated with Oxy. Pt remains alert.
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
## 2022-09-20 NOTE — PROGRESS NOTES
Problem: Falls - Risk of  Goal: *Absence of Falls  Description: Document Dayne Ricketts Fall Risk and appropriate interventions in the flowsheet.   Outcome: Progressing Towards Goal  Note: Fall Risk Interventions:            Medication Interventions: Bed/chair exit alarm    Elimination Interventions: Call light in reach, Patient to call for help with toileting needs    History of Falls Interventions: Bed/chair exit alarm         Problem: Patient Education: Go to Patient Education Activity  Goal: Patient/Family Education  Outcome: Progressing Towards Goal     Problem: Pain  Goal: *Control of Pain  Outcome: Progressing Towards Goal     Problem: Patient Education: Go to Patient Education Activity  Goal: Patient/Family Education  Outcome: Progressing Towards Goal

## 2022-09-20 NOTE — REHAB NOTE
[facility] REFERRAL CONTACT NOTE  [id number] [place] [address] for Physical Rehabilitation      Thank you for the opportunity to review this patient's case for admission to [id number] [place] [address] for Physical Rehabilitation. Based on our pre-admission screening:     [ x] This patient does not meet criteria for admission to [facility] for Physical Rehabilitation due to:    [ x] Other:Pt does not meet therapy density. Pt was evaluated and discharged by OT. Again, Thank you for this referral. Should you have any questions please do not hesitate to call.      Sincerely,  Jaycob Pickett Liaison  [facility]  [phone]

## 2022-09-21 PROCEDURE — 74011250637 HC RX REV CODE- 250/637: Performed by: PHYSICIAN ASSISTANT

## 2022-09-21 PROCEDURE — 74011000250 HC RX REV CODE- 250: Performed by: PHYSICIAN ASSISTANT

## 2022-09-21 PROCEDURE — 65270000029 HC RM PRIVATE

## 2022-09-21 PROCEDURE — 99232 SBSQ HOSP IP/OBS MODERATE 35: CPT | Performed by: HOSPITALIST

## 2022-09-21 PROCEDURE — 74011250637 HC RX REV CODE- 250/637: Performed by: HOSPITALIST

## 2022-09-21 RX ORDER — SENNOSIDES 8.6 MG/1
2 TABLET ORAL
Status: DISCONTINUED | OUTPATIENT
Start: 2022-09-21 | End: 2022-09-23 | Stop reason: HOSPADM

## 2022-09-21 RX ADMIN — BACLOFEN 20 MG: 10 TABLET ORAL at 22:00

## 2022-09-21 RX ADMIN — SODIUM CHLORIDE, PRESERVATIVE FREE 10 ML: 5 INJECTION INTRAVENOUS at 22:00

## 2022-09-21 RX ADMIN — SODIUM CHLORIDE, PRESERVATIVE FREE 10 ML: 5 INJECTION INTRAVENOUS at 06:14

## 2022-09-21 RX ADMIN — BACLOFEN 20 MG: 10 TABLET ORAL at 10:25

## 2022-09-21 RX ADMIN — FAMOTIDINE 20 MG: 20 TABLET ORAL at 17:44

## 2022-09-21 RX ADMIN — BACLOFEN 20 MG: 10 TABLET ORAL at 17:44

## 2022-09-21 RX ADMIN — OXYCODONE HYDROCHLORIDE AND ACETAMINOPHEN 2 TABLET: 5; 325 TABLET ORAL at 10:25

## 2022-09-21 RX ADMIN — OXYCODONE HYDROCHLORIDE AND ACETAMINOPHEN 2 TABLET: 5; 325 TABLET ORAL at 22:00

## 2022-09-21 RX ADMIN — OXYCODONE HYDROCHLORIDE AND ACETAMINOPHEN 2 TABLET: 5; 325 TABLET ORAL at 17:44

## 2022-09-21 RX ADMIN — MIDODRINE HYDROCHLORIDE 5 MG: 5 TABLET ORAL at 10:25

## 2022-09-21 RX ADMIN — Medication 1 TABLET: at 10:25

## 2022-09-21 RX ADMIN — BISACODYL 10 MG: 10 SUPPOSITORY RECTAL at 13:03

## 2022-09-21 RX ADMIN — SENNOSIDES 17.2 MG: 8.6 TABLET, FILM COATED ORAL at 22:00

## 2022-09-21 RX ADMIN — TAMSULOSIN HYDROCHLORIDE 0.4 MG: 0.4 CAPSULE ORAL at 10:25

## 2022-09-21 NOTE — PROGRESS NOTES
1050: Attempted to see patient for PT treatment session however he requests to return later in the day as he is just waking up and hasn't eaten his breakfast yet. Will continue to follow. Federico Monson, PT    [id number]: Second attempt to see patient and he is eating lunch and requests to defer treatment.   Federico Monson, PT

## 2022-09-21 NOTE — PROGRESS NOTES
Bedside report given to [facility], RN.  Pt resting quietly in bed at this time, call bell within reach

## 2022-09-21 NOTE — ROUTINE PROCESS
Wound Prevention Checklist    Patient: Antony Heath (49 y.o. male)  Date: 9/21/2022  Diagnosis: Femur fracture, right ([address] [S72.91XA] Femur fracture, right ([address]    Precautions: Fall, Skin, TTWB (RLE)       []  Heel prevention boots placed on patient    []  Patient turned q2h during shift    []  Lift team ordered    [x]  Patient on [place] bed/Specialty bed    []  Each Wound is documented during shift (Stage, Color, drainage, odor, measurements, and dressings)    [x]  Dual skin checks done at bedside during shift report with Sergio Zuniga RN

## 2022-09-21 NOTE — PROGRESS NOTES
Problem: Falls - Risk of  Goal: *Absence of Falls  Description: Document Mary Downing Fall Risk and appropriate interventions in the flowsheet. Outcome: Progressing Towards Goal  Note: Fall Risk Interventions:            Medication Interventions: Bed/chair exit alarm    Elimination Interventions: Bed/chair exit alarm, Call light in reach, Patient to call for help with toileting needs, Stay With Me (per policy)    History of Falls Interventions: Bed/chair exit alarm         Problem: Patient Education: Go to Patient Education Activity  Goal: Patient/Family Education  Outcome: Progressing Towards Goal     Problem: Pain  Goal: *Control of Pain  Outcome: Progressing Towards Goal     Problem: Patient Education: Go to Patient Education Activity  Goal: Patient/Family Education  Outcome: Progressing Towards Goal     Problem: Pressure Injury - Risk of  Goal: *Prevention of pressure injury  Description: Document Leander Scale and appropriate interventions in the flowsheet.   Outcome: Progressing Towards Goal  Note: Pressure Injury Interventions:  Sensory Interventions: Assess changes in LOC    Moisture Interventions: Absorbent underpads, Internal/External urinary devices    Activity Interventions: Pressure redistribution bed/mattress(bed type)    Mobility Interventions: Float heels, Pressure redistribution bed/mattress (bed type)    Nutrition Interventions: Document food/fluid/supplement intake    Friction and Shear Interventions: Minimize layers                Problem: Patient Education: Go to Patient Education Activity  Goal: Patient/Family Education  Outcome: Progressing Towards Goal     Problem: Nutrition Deficit  Goal: *Optimize nutritional status  Outcome: Progressing Towards Goal

## 2022-09-21 NOTE — PROGRESS NOTES
CM talked with Yue with [facility] to discuss pt's transitional referral of SNF. CM provided pt's demographic information to submit for insurance authorization. Alley with [facility] requested a LTSS/UIA screen. [facility]  will provide updates when available.          Jeffrey Trevino MSW  Care Manager

## 2022-09-21 NOTE — PROGRESS NOTES
[facility] Hospitalist Group  Progress Note    Patient: Ruby Armijo Age: 49 y.o. : 1975 MR#: [id number] SSN: [id number]  Date/Time: 2022     Subjective: Patient feels fine, no new complaints, pain well controlled. Patient constipated, passing flatus, no nausea or vomiting     Assessment/Plan:     Right femur fracture status post nailing 9/15/2022  Paraplegia  History of right sacral decubitus ulcer stage IV  Neurogenic bladder  Wheelchair and bedbound status  Mild hypotension   Severe malnutrition       Plan  BP more stable, will decrease midodrine and monitor  Continue pain medications as tolerated  We will continue bowel regimen, increase Senokot and suppositories as needed  Continue PT/OT eval and treatment  DVT prophylaxis per Ortho    Discussed with the patient at the bedside. Disposition: SNF once bed available      Case discussed with:  [x]Patient  []Family  []Nursing  []Case Management  DVT Prophylaxis:  [x]Lovenox  []Hep SQ  []SCDs  []Coumadin   []Eliquis/Xarelto     Objective:   VS: Visit Vitals  BP (!) 176/94 (BP 1 Location: Left upper arm, BP Patient Position: At rest)   Pulse 70   Temp 98 °F (36.7 °C)   Resp 16   Ht 5' 9\" (1.753 m)   Wt 52.6 kg (116 lb)   SpO2 98%   BMI 17.13 kg/m²      Tmax/24hrs: Temp (24hrs), Av.9 °F (36.6 °C), Min:97.5 °F (36.4 °C), Max:98.2 °F (36.8 °C)  IOBRIEF  Intake/Output Summary (Last 24 hours) at 2022 1849  Last data filed at 2022 0400  Gross per 24 hour   Intake --   Output 1100 ml   Net -1100 ml       General:  Alert, cooperative, no acute distress    Pulmonary:  CTA Bilaterally. No Wheezing/Rales. Cardiovascular: Regular rate and Rhythm. GI:  Soft, Non distended, Non tender. + Bowel sounds. Extremities: Right hip and thigh area dressing noted  Psych: Good insight. Not anxious or agitated. Neurologic: Alert and oriented X 3.   Contracted lower extremity, moves upper extremity  Additional: Diffuse muscle wasting    Medications:   Current Facility-Administered Medications   Medication Dose Route Frequency    senna (SENOKOT) tablet 17.2 mg  2 Tablet Oral QHS    midodrine (PROAMATINE) tablet 5 mg  5 mg Oral BID WITH MEALS    flintstones complete (FLINTSTONES) chewable tablet 1 Tablet  1 Tablet Oral DAILY    [Held by provider] morphine injection 1 mg  1 mg IntraVENous Q8H PRN    sodium chloride (NS) flush 5-40 mL  5-40 mL IntraVENous Q8H    sodium chloride (NS) flush 5-40 mL  5-40 mL IntraVENous PRN    acetaminophen (TYLENOL) tablet 650 mg  650 mg Oral Q6H PRN    Or    acetaminophen (TYLENOL) suppository 650 mg  650 mg Rectal Q6H PRN    polyethylene glycol (MIRALAX) packet 17 g  17 g Oral DAILY PRN    ondansetron (ZOFRAN ODT) tablet 4 mg  4 mg Oral Q8H PRN    Or    ondansetron (ZOFRAN) injection 4 mg  4 mg IntraVENous Q6H PRN    melatonin tablet 5 mg  5 mg Oral QHS PRN    albuterol-ipratropium (DUO-NEB) 2.5 MG-0.5 MG/3 ML  3 mL Nebulization Q6H PRN    naloxone (NARCAN) injection 0.4 mg  0.4 mg IntraVENous EVERY 2 MINUTES AS NEEDED    baclofen (LIORESAL) tablet 20 mg  20 mg Oral QID    bisacodyL (DULCOLAX) suppository 10 mg  10 mg Rectal DAILY PRN    diazePAM (VALIUM) tablet 10 mg  10 mg Oral BID PRN    famotidine (PEPCID) tablet 20 mg  20 mg Oral BID    oxybutynin chloride XL (DITROPAN XL) tablet 10 mg  10 mg Oral DAILY    polyethylene glycol (MIRALAX) packet 17 g  17 g Oral DAILY    tamsulosin (FLOMAX) capsule 0.4 mg  0.4 mg Oral DAILY    enoxaparin (LOVENOX) injection 30 mg  30 mg SubCUTAneous Q24H    oxyCODONE-acetaminophen (PERCOCET) 5-325 mg per tablet 1-2 Tablet  1-2 Tablet Oral Q4H PRN       Labs:    No results found for this or any previous visit (from the past 24 hour(s)). Signed By: Army Dance, MD     September 21, 2022      Disclaimer: Sections of this note are dictated using utilizing voice recognition software. Minor typographical errors may be present.  If questions arise, please do not hesitate to contact me or call our department.

## 2022-09-22 PROCEDURE — 74011000250 HC RX REV CODE- 250: Performed by: PHYSICIAN ASSISTANT

## 2022-09-22 PROCEDURE — 74011250637 HC RX REV CODE- 250/637: Performed by: PHYSICIAN ASSISTANT

## 2022-09-22 PROCEDURE — 99232 SBSQ HOSP IP/OBS MODERATE 35: CPT | Performed by: HOSPITALIST

## 2022-09-22 PROCEDURE — 74011250636 HC RX REV CODE- 250/636: Performed by: PHYSICIAN ASSISTANT

## 2022-09-22 PROCEDURE — 74011250636 HC RX REV CODE- 250/636: Performed by: HOSPITALIST

## 2022-09-22 PROCEDURE — 74011250637 HC RX REV CODE- 250/637: Performed by: HOSPITALIST

## 2022-09-22 PROCEDURE — 65270000029 HC RM PRIVATE

## 2022-09-22 PROCEDURE — 97530 THERAPEUTIC ACTIVITIES: CPT

## 2022-09-22 PROCEDURE — 2709999900 HC NON-CHARGEABLE SUPPLY

## 2022-09-22 RX ORDER — ENOXAPARIN SODIUM 100 MG/ML
40 INJECTION SUBCUTANEOUS EVERY 24 HOURS
Status: DISCONTINUED | OUTPATIENT
Start: 2022-09-23 | End: 2022-09-23 | Stop reason: HOSPADM

## 2022-09-22 RX ORDER — MIDODRINE HYDROCHLORIDE 5 MG/1
2.5 TABLET ORAL 2 TIMES DAILY WITH MEALS
Status: DISCONTINUED | OUTPATIENT
Start: 2022-09-22 | End: 2022-09-23 | Stop reason: HOSPADM

## 2022-09-22 RX ADMIN — SODIUM CHLORIDE, PRESERVATIVE FREE 10 ML: 5 INJECTION INTRAVENOUS at 17:46

## 2022-09-22 RX ADMIN — MIDODRINE HYDROCHLORIDE 5 MG: 5 TABLET ORAL at 08:50

## 2022-09-22 RX ADMIN — SODIUM CHLORIDE, PRESERVATIVE FREE 10 ML: 5 INJECTION INTRAVENOUS at 23:19

## 2022-09-22 RX ADMIN — Medication 1 TABLET: at 08:50

## 2022-09-22 RX ADMIN — BACLOFEN 20 MG: 10 TABLET ORAL at 08:50

## 2022-09-22 RX ADMIN — ENOXAPARIN SODIUM 40 MG: 100 INJECTION SUBCUTANEOUS at 23:19

## 2022-09-22 RX ADMIN — BISACODYL 10 MG: 10 SUPPOSITORY RECTAL at 17:41

## 2022-09-22 RX ADMIN — ENOXAPARIN SODIUM 30 MG: 100 INJECTION SUBCUTANEOUS at 00:04

## 2022-09-22 RX ADMIN — FAMOTIDINE 20 MG: 20 TABLET ORAL at 09:07

## 2022-09-22 RX ADMIN — BACLOFEN 20 MG: 10 TABLET ORAL at 23:19

## 2022-09-22 RX ADMIN — TAMSULOSIN HYDROCHLORIDE 0.4 MG: 0.4 CAPSULE ORAL at 08:50

## 2022-09-22 RX ADMIN — SODIUM CHLORIDE, PRESERVATIVE FREE 10 ML: 5 INJECTION INTRAVENOUS at 06:21

## 2022-09-22 RX ADMIN — BACLOFEN 20 MG: 10 TABLET ORAL at 17:42

## 2022-09-22 RX ADMIN — MIDODRINE HYDROCHLORIDE 2.5 MG: 5 TABLET ORAL at 17:41

## 2022-09-22 RX ADMIN — OXYCODONE HYDROCHLORIDE AND ACETAMINOPHEN 2 TABLET: 5; 325 TABLET ORAL at 05:36

## 2022-09-22 RX ADMIN — OXYBUTYNIN CHLORIDE 10 MG: 5 TABLET, EXTENDED RELEASE ORAL at 08:50

## 2022-09-22 RX ADMIN — SENNOSIDES 17.2 MG: 8.6 TABLET, FILM COATED ORAL at 23:19

## 2022-09-22 RX ADMIN — FAMOTIDINE 20 MG: 20 TABLET ORAL at 17:41

## 2022-09-22 NOTE — PROGRESS NOTES
[facility] Hospitalist Group  Progress Note    Patient: José Antonio Oconnor Age: 49 y.o. : 1975 MR#: [id number] SSN: [id number]  Date/Time: 2022     Subjective: Patient feels fine, no new complaints, pain well controlled. Patient constipated, had a small BM yesterday     Assessment/Plan:     Right femur fracture status post nailing 9/15/2022  Paraplegia  History of right sacral decubitus ulcer stage IV  Neurogenic bladder  Wheelchair and bedbound status  Mild hypotension   Severe malnutrition       Plan  BP more stable, will decrease midodrine and monitor  Continue pain medications as tolerated  We will continue bowel regimen, Senokot and suppositories as needed  Will add 1 dose of lactulose  Continue PT/OT eval and treatment  DVT prophylaxis per Ortho    Discussed with the patient at the bedside. Disposition: SNF once bed available    Discussed with case management, and LTSS signed      Case discussed with:  [x]Patient  []Family  []Nursing  []Case Management  DVT Prophylaxis:  [x]Lovenox  []Hep SQ  []SCDs  []Coumadin   []Eliquis/Xarelto     Objective:   VS: Visit Vitals  /64 (BP 1 Location: Right upper arm, BP Patient Position: Lying)   Pulse 79   Temp 98.4 °F (36.9 °C)   Resp 16   Ht 5' 9\" (1.753 m)   Wt 52.6 kg (116 lb)   SpO2 97%   BMI 17.13 kg/m²      Tmax/24hrs: Temp (24hrs), Av.8 °F (36.6 °C), Min:97.3 °F (36.3 °C), Max:98.6 °F (37 °C)  IOBRIEF  Intake/Output Summary (Last 24 hours) at 2022 1650  Last data filed at 2022 1527  Gross per 24 hour   Intake 480 ml   Output 300 ml   Net 180 ml       General:  Alert, cooperative, no acute distress    Pulmonary:  CTA Bilaterally. No Wheezing/Rales. Cardiovascular: Regular rate and Rhythm. GI:  Soft, Non distended, Non tender. + Bowel sounds. Extremities: Right hip and thigh area dressing noted  Psych: Good insight. Not anxious or agitated. Neurologic: Alert and oriented X 3.   Contracted lower extremity, moves upper extremity  Additional: Diffuse muscle wasting    Medications:   Current Facility-Administered Medications   Medication Dose Route Frequency    midodrine (PROAMATINE) tablet 2.5 mg  2.5 mg Oral BID WITH MEALS    [START ON 9/23/2022] enoxaparin (LOVENOX) injection 40 mg  40 mg SubCUTAneous Q24H    senna (SENOKOT) tablet 17.2 mg  2 Tablet Oral QHS    flintstones complete (FLINTSTONES) chewable tablet 1 Tablet  1 Tablet Oral DAILY    [Held by provider] morphine injection 1 mg  1 mg IntraVENous Q8H PRN    sodium chloride (NS) flush 5-40 mL  5-40 mL IntraVENous Q8H    sodium chloride (NS) flush 5-40 mL  5-40 mL IntraVENous PRN    acetaminophen (TYLENOL) tablet 650 mg  650 mg Oral Q6H PRN    Or    acetaminophen (TYLENOL) suppository 650 mg  650 mg Rectal Q6H PRN    polyethylene glycol (MIRALAX) packet 17 g  17 g Oral DAILY PRN    ondansetron (ZOFRAN ODT) tablet 4 mg  4 mg Oral Q8H PRN    Or    ondansetron (ZOFRAN) injection 4 mg  4 mg IntraVENous Q6H PRN    melatonin tablet 5 mg  5 mg Oral QHS PRN    albuterol-ipratropium (DUO-NEB) 2.5 MG-0.5 MG/3 ML  3 mL Nebulization Q6H PRN    naloxone (NARCAN) injection 0.4 mg  0.4 mg IntraVENous EVERY 2 MINUTES AS NEEDED    baclofen (LIORESAL) tablet 20 mg  20 mg Oral QID    bisacodyL (DULCOLAX) suppository 10 mg  10 mg Rectal DAILY PRN    diazePAM (VALIUM) tablet 10 mg  10 mg Oral BID PRN    famotidine (PEPCID) tablet 20 mg  20 mg Oral BID    oxybutynin chloride XL (DITROPAN XL) tablet 10 mg  10 mg Oral DAILY    polyethylene glycol (MIRALAX) packet 17 g  17 g Oral DAILY    tamsulosin (FLOMAX) capsule 0.4 mg  0.4 mg Oral DAILY    oxyCODONE-acetaminophen (PERCOCET) 5-325 mg per tablet 1-2 Tablet  1-2 Tablet Oral Q4H PRN       Labs:    No results found for this or any previous visit (from the past 24 hour(s)). Signed By: Kathya Graham MD     September 22, 2022      Disclaimer: Sections of this note are dictated using utilizing voice recognition software. Minor typographical errors may be present. If questions arise, please do not hesitate to contact me or call our department.

## 2022-09-22 NOTE — PROGRESS NOTES
HAFSA talked with Emili with [facility] to discuss pt's transitional plan of care of LTC. Alley with [facility] stated that pt's insurance authorization has been approved for placement. Alley with [facility] stated pt can be admitted on Friday, 9/23/22. Alley with [facility] requested a copy of pt's LTSS/UIA screen. [id number] talked with Logan Whaley at [address] to schedule pt's dc transportation.  Logan Code at [address] schedule pt's dc transportation for Friday, 9/23/22 from [facility] to [facility]  at 2 PM.   TRIP [id number]        DAGOBERTO Ramirez  Care Manager

## 2022-09-22 NOTE — PROGRESS NOTES
Pt is upset and yelling at this writer after an attempt to get pt out of bed to bedside commode was not completed. This writer, 2 additional RN's and CNA attempted to lift pt, to place on bedside commode. The writer was unable to complete the transfer d/t not being bale to safely tranfers pt, with assistance without using improper body mechanics. Pt requested this writer ' bear hug him\" and lift him to commode and this writer did not feel comfortable performing \"bear hug\" on pt. Nor did the other 2Rn's    Physical therapy  met with pt today and did not speak to the RN or any other RN on the floor related to transferring pt from bed to commode. Encouraged pt to contiue to use bedpan until safe/proper body mechanics for the staff can be discussed with physical therpy. Pt is upset. Informed oncoming nurse of situation at bedside report. Pt continued to yell at this writer.

## 2022-09-22 NOTE — PROGRESS NOTES
CM completed LTSS/UAI screen for pt.    Screening ID # : [id number]  LTSS/UIA screen is awaiting doctor's approval      1426- CM motified Dr. Kim Hanson or the above information via [address] [place] [address] , MSW  Care Manager

## 2022-09-22 NOTE — ROUTINE PROCESS
Wound Prevention Checklist    Patient: Elizabeth Ricketts (49 y.o. male)  Date: 9/22/2022  Diagnosis: Femur fracture, right ([address] [S72.91XA] Femur fracture, right ([address]    Precautions: Fall, Skin, TTWB (RLE)       []  Heel prevention boots placed on patient    []  Patient turned q2h during shift    []  Lift team ordered    [x]  Patient on [facility] bed/Specialty bed    [x]  Each Wound is documented during shift (Stage, Color, drainage, odor, measurements, and dressings)    [x]  Dual skin check done with Kanu Anderson RN

## 2022-09-22 NOTE — ROUTINE PROCESS
Bedside shift change report given to [address] Tonio Bullard RN (oncoming nurse) by Ayaz Garzon RN (offgoing nurse). Report included the following information SBAR, Kardex, Intake/Output, and Recent Results.

## 2022-09-22 NOTE — PROGRESS NOTES
Problem: Mobility Impaired (Adult and Pediatric)  Goal: *Acute Goals and Plan of Care (Insert Text)  Description: Physical Therapy Goals  Initiated 9/19/2022 and to be accomplished within 7 day(s)  1. Patient will move from supine to sit and sit to supine  in bed with minimal assistance/contact guard assist.    2.  Patient will transfer from bed to chair and chair to bed with moderate assistance  using the least restrictive device. 3.  Patient will sit on EOB for 5 minutes with CGA and no LOB  4. Patient will manually propel himself 50 ft indep with manual wheelchair. PLOF: Pt lives alone in a [facility] with ramp access. 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 He transfer from Bed-electric wheelchair with some assist of his caregiver. Indep with feeding. Assist with bathing/dressing. Outcome: Progressing Towards Goal   PHYSICAL THERAPY TREATMENT    Patient: Maty Galvan (49 y.o. male)  Date: 9/22/2022  Diagnosis: Femur fracture, right ([facility] [S72.91XA] Femur fracture, right ([address]  Procedure(s) (LRB):  RIGHT FEMUR RETROGRADE NAIL (Right) 7 Days Post-Op  Precautions: Fall, Skin, TTWB (RLE)  PLOF: see above    ASSESSMENT:  Pt in bed with both LEs pulled up into flexion with pillow between the knees and pelvis rotated to the left. Extensive amount of time spent performing passive ROM using multiple techniques to attempt to get right hip and knee into 0 degrees of flexion. ROM limited significantly on the right due to muscle spasms. In the end patient was lacking approximately 25 degrees of knee extension. Educated patient on using log roll technique to prevent shearing force on sacrum. Pt rolled to the left with mod A and pushed up to sitting with max A. Pt required constant support and B UEs support to maintain sitting balance. Pt reported dizziness in sitting and requested to return to supine after approximately 2 minutes.   Pt required mod A for sit to supine to assist with raising LEs into the bed. Pt was scooted up in bed with total assist.  Pt was left in bed with needs in reach and CNA present taking vitals. Progression toward goals:   []      Improving appropriately and progressing toward goals  [x]      Improving slowly and progressing toward goals  []      Not making progress toward goals and plan of care will be adjusted     PLAN:  Patient continues to benefit from skilled intervention to address the above impairments. Continue treatment per established plan of care. Further Equipment Recommendations for Discharge:  N/A    AMPAC: Based on an AM-PAC score of 7/20 omitting stairs and their current functional mobility deficits, it is recommended that the patient have 3-5 sessions per week of Physical Therapy at d/c to increase the patient's independence. This AMPAC score should be considered in conjunction with interdisciplinary team recommendations to determine the most appropriate discharge setting. Patient's social support, diagnosis, medical stability, and prior level of function should also be taken into consideration. SUBJECTIVE:   Patient stated That's all for today? Is there going to be a day that we just stretch all day?     OBJECTIVE DATA SUMMARY:   Critical Behavior:  Neurologic State: Alert  Orientation Level: Oriented X4  Cognition: Follows commands  Safety/Judgement: Fall prevention  Functional Mobility Training:  Bed Mobility:  Rolling: Moderate assistance (to the left)  Supine to Sit: Maximum assistance  Sit to Supine: Moderate assistance  Scooting: Total assistance (to prevent friction on sacral region)     Balance:  Sitting - Static: Poor (constant support)            Pain:  Pain level pre-treatment: 4/10  Pain level post-treatment: 4/10   Pain Intervention(s): Rest, Repositioning   Response to intervention: Nurse notified, See doc flow    Activity Tolerance:   poor  Please refer to the flowsheet for vital signs taken during this treatment.   After treatment: [] Patient left in no apparent distress sitting up in chair  [x] Patient left in no apparent distress in bed  [x] Call bell left within reach  [x] Nursing present  [] Caregiver present  [] Bed alarm activated  [] SCDs applied      COMMUNICATION/EDUCATION:   [x]         Role of Physical Therapy in the acute care setting. [x]         Fall prevention education was provided and the patient/caregiver indicated understanding. [x]         Patient/family have participated as able in working toward goals and plan of care. [x]         Patient/family agree to work toward stated goals and plan of care. []         Patient understands intent and goals of therapy, but is neutral about his/her participation. []         Patient is unable to participate in stated goals/plan of care: ongoing with therapy staff.  []         Other:        Janet Salazar, PT   Time Calculation: 45 mins    Chauncey Miller AM-PAC® Basic Mobility Inpatient Short Form (6-Clicks) Version 2    How much HELP from another person does the patient currently need    (If the patient hasn't done an activity recently, how much help from another person do you think he/she would need if he/she tried?)   Total (Total A or Dep)   A Lot  (Mod to Max A)   A Little (Sup or Min A)   None (Mod I to I)   Turning from your back to your side while in a flat bed without using bedrails? [] 1 [x] 2 [] 3 [] 4   2. Moving from lying on your back to sitting on the side of a flat bed without using bedrails? [] 1 [x] 2 [] 3 [] 4   3. Moving to and from a bed to a chair (including a wheelchair)? [x] 1 [] 2 [] 3 [] 4   4. Standing up from a chair using your arms (e.g., wheelchair, or bedside chair)? [x] 1 [] 2 [] 3 [] 4   5. Walking in hospital room? [x] 1 [] 2 [] 3 [] 4   6. Climbing 3-5 steps with a railing?+   [] 1 [] 2 [] 3 [] 4   +If stair climbing cannot be assessed, skip item #6. Sum responses from items 1-5.          Based on an AM-PAC score of 7/20 omitting stairs and their current functional mobility deficits, it is recommended that the patient have 3-5 sessions per week of Physical Therapy at d/c to increase the patient's independence.

## 2022-09-22 NOTE — PROGRESS NOTES
Pharmacist Review and Automatic Dose Adjustment of Prophylactic Enoxaparin    *Review reason for admission/hospital problem list*    The reviewing pharmacist has made an adjustment to the ordered enoxaparin dose or converted to UFH per the approved [facility] protocol and table as identified below. Ruby Armijo is a 49 y.o. male. No lab exists for component: CREATININE    Estimated Creatinine Clearance: 96 mL/min (A) (by C-G formula based on SCr of 0.37 mg/dL (L)).     Height:   Ht Readings from Last 1 Encounters:   09/20/22 175.3 cm (69\")     Weight:  Wt Readings from Last 1 Encounters:   09/20/22 52.6 kg (116 lb)               Plan: Based upon the patient's weight and renal function, the ordered enoxaparin dose of 30 mg q 24 hours has been changed/converted to 40 mg every 24 hours      Thank you,  MEDHAT Yoder

## 2022-09-22 NOTE — PROGRESS NOTES
Problem: Falls - Risk of  Goal: *Absence of Falls  Description: Document Bettina Damon Fall Risk and appropriate interventions in the flowsheet. Outcome: Progressing Towards Goal  Note: Fall Risk Interventions:            Medication Interventions: Bed/chair exit alarm    Elimination Interventions: Call light in reach, Patient to call for help with toileting needs    History of Falls Interventions: Bed/chair exit alarm         Problem: Patient Education: Go to Patient Education Activity  Goal: Patient/Family Education  Outcome: Progressing Towards Goal     Problem: Pain  Goal: *Control of Pain  Outcome: Progressing Towards Goal     Problem: Patient Education: Go to Patient Education Activity  Goal: Patient/Family Education  Outcome: Progressing Towards Goal     Problem: Pressure Injury - Risk of  Goal: *Prevention of pressure injury  Description: Document Leander Scale and appropriate interventions in the flowsheet.   Outcome: Progressing Towards Goal  Note: Pressure Injury Interventions:  Sensory Interventions: Assess changes in LOC    Moisture Interventions: Absorbent underpads    Activity Interventions: Pressure redistribution bed/mattress(bed type)    Mobility Interventions: HOB 30 degrees or less    Nutrition Interventions: Document food/fluid/supplement intake    Friction and Shear Interventions: Apply protective barrier, creams and emollients, HOB 30 degrees or less                Problem: Patient Education: Go to Patient Education Activity  Goal: Patient/Family Education  Outcome: Progressing Towards Goal

## 2022-09-22 NOTE — PROGRESS NOTES
Bedside and Verbal shift change report given to Alpa Ramirez RN   (oncoming nurse) by Cecy Marcus RN  (offgoing nurse). Report included the following information SBAR, Intake/Output, and MAR.

## 2022-09-23 VITALS
TEMPERATURE: 98.7 F | SYSTOLIC BLOOD PRESSURE: 96 MMHG | HEIGHT: 69 IN | OXYGEN SATURATION: 97 % | BODY MASS INDEX: 17.18 KG/M2 | WEIGHT: 116 LBS | HEART RATE: 79 BPM | RESPIRATION RATE: 18 BRPM | DIASTOLIC BLOOD PRESSURE: 61 MMHG

## 2022-09-23 LAB
COVID-19 RAPID TEST, COVR: NOT DETECTED
SOURCE, COVRS: NORMAL

## 2022-09-23 PROCEDURE — 77030041076 HC DRSG AG OPTICELL MDII -A

## 2022-09-23 PROCEDURE — 77030040393 HC DRSG OPTIFOAM GENT MDII -B

## 2022-09-23 PROCEDURE — 87635 SARS-COV-2 COVID-19 AMP PRB: CPT

## 2022-09-23 PROCEDURE — 74011000250 HC RX REV CODE- 250: Performed by: PHYSICIAN ASSISTANT

## 2022-09-23 PROCEDURE — 74011250637 HC RX REV CODE- 250/637: Performed by: HOSPITALIST

## 2022-09-23 PROCEDURE — 99239 HOSP IP/OBS DSCHRG MGMT >30: CPT | Performed by: INTERNAL MEDICINE

## 2022-09-23 PROCEDURE — 74011250637 HC RX REV CODE- 250/637: Performed by: PHYSICIAN ASSISTANT

## 2022-09-23 PROCEDURE — 2709999900 HC NON-CHARGEABLE SUPPLY

## 2022-09-23 RX ORDER — DIAZEPAM 10 MG/1
10 TABLET ORAL
Qty: 14 TABLET | Refills: 0 | Status: SHIPPED
Start: 2022-09-23 | End: 2022-09-23 | Stop reason: SDUPTHER

## 2022-09-23 RX ORDER — PEDI MULTIVIT 158/IRON/VIT K1 18MG-10MCG
1 TABLET,CHEWABLE ORAL DAILY
Qty: 30 TABLET | Refills: 0 | Status: SHIPPED
Start: 2022-09-24 | End: 2022-10-24

## 2022-09-23 RX ORDER — DIAZEPAM 10 MG/1
10 TABLET ORAL
Qty: 14 TABLET | Refills: 0 | Status: SHIPPED | OUTPATIENT
Start: 2022-09-23

## 2022-09-23 RX ORDER — MIDODRINE HYDROCHLORIDE 2.5 MG/1
2.5 TABLET ORAL 2 TIMES DAILY WITH MEALS
Qty: 60 TABLET | Refills: 0 | Status: SHIPPED
Start: 2022-09-23 | End: 2022-10-23

## 2022-09-23 RX ORDER — OXYCODONE AND ACETAMINOPHEN 5; 325 MG/1; MG/1
1 TABLET ORAL
Qty: 16 TABLET | Refills: 0 | Status: SHIPPED
Start: 2022-09-23 | End: 2022-09-30

## 2022-09-23 RX ORDER — OXYCODONE AND ACETAMINOPHEN 5; 325 MG/1; MG/1
1-2 TABLET ORAL
Qty: 14 TABLET | Refills: 0 | Status: SHIPPED | OUTPATIENT
Start: 2022-09-23 | End: 2022-09-26

## 2022-09-23 RX ADMIN — OXYCODONE HYDROCHLORIDE AND ACETAMINOPHEN 2 TABLET: 5; 325 TABLET ORAL at 00:26

## 2022-09-23 RX ADMIN — OXYCODONE HYDROCHLORIDE AND ACETAMINOPHEN 2 TABLET: 5; 325 TABLET ORAL at 16:23

## 2022-09-23 RX ADMIN — TAMSULOSIN HYDROCHLORIDE 0.4 MG: 0.4 CAPSULE ORAL at 09:08

## 2022-09-23 RX ADMIN — OXYCODONE HYDROCHLORIDE AND ACETAMINOPHEN 2 TABLET: 5; 325 TABLET ORAL at 05:20

## 2022-09-23 RX ADMIN — OXYCODONE HYDROCHLORIDE AND ACETAMINOPHEN 2 TABLET: 5; 325 TABLET ORAL at 10:13

## 2022-09-23 RX ADMIN — BACLOFEN 20 MG: 10 TABLET ORAL at 09:08

## 2022-09-23 RX ADMIN — Medication 1 TABLET: at 09:07

## 2022-09-23 RX ADMIN — MIDODRINE HYDROCHLORIDE 2.5 MG: 5 TABLET ORAL at 09:07

## 2022-09-23 RX ADMIN — OXYBUTYNIN CHLORIDE 10 MG: 5 TABLET, EXTENDED RELEASE ORAL at 09:07

## 2022-09-23 RX ADMIN — SODIUM CHLORIDE, PRESERVATIVE FREE 10 ML: 5 INJECTION INTRAVENOUS at 05:43

## 2022-09-23 RX ADMIN — SODIUM CHLORIDE, PRESERVATIVE FREE 10 ML: 5 INJECTION INTRAVENOUS at 13:24

## 2022-09-23 RX ADMIN — BACLOFEN 20 MG: 10 TABLET ORAL at 13:24
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
## 2022-09-23 NOTE — PROGRESS NOTES
Problem: Falls - Risk of  Goal: *Absence of Falls  Description: Document Makr Tia Fall Risk and appropriate interventions in the flowsheet. Outcome: Progressing Towards Goal  Note: Fall Risk Interventions:            Medication Interventions: Patient to call before getting OOB, Teach patient to arise slowly    Elimination Interventions: Bed/chair exit alarm, Call light in reach, Patient to call for help with toileting needs    History of Falls Interventions: Consult care management for discharge planning         Problem: Patient Education: Go to Patient Education Activity  Goal: Patient/Family Education  Outcome: Progressing Towards Goal     Problem: Pain  Goal: *Control of Pain  Outcome: Progressing Towards Goal     Problem: Patient Education: Go to Patient Education Activity  Goal: Patient/Family Education  Outcome: Progressing Towards Goal     Problem: Pressure Injury - Risk of  Goal: *Prevention of pressure injury  Description: Document Leander Scale and appropriate interventions in the flowsheet.   Outcome: Progressing Towards Goal  Note: Pressure Injury Interventions:  Sensory Interventions: Assess changes in LOC    Moisture Interventions: Absorbent underpads, Maintain skin hydration (lotion/cream)    Activity Interventions: Assess need for specialty bed, Increase time out of bed, Pressure redistribution bed/mattress(bed type)    Mobility Interventions: HOB 30 degrees or less    Nutrition Interventions: Document food/fluid/supplement intake    Friction and Shear Interventions: HOB 30 degrees or less                Problem: Patient Education: Go to Patient Education Activity  Goal: Patient/Family Education  Outcome: Progressing Towards Goal     Problem: Patient Education: Go to Patient Education Activity  Goal: Patient/Family Education  Outcome: Progressing Towards Goal     Problem: Patient Education: Go to Patient Education Activity  Goal: Patient/Family Education  Outcome: Progressing Towards Goal

## 2022-09-23 NOTE — DISCHARGE INSTRUCTIONS
DISCHARGE SUMMARY from Nurse    PATIENT INSTRUCTIONS:    After general anesthesia or intravenous sedation, for 24 hours or while taking prescription Narcotics:  Limit your activities  Do not drive and operate hazardous machinery  Do not make important personal or business decisions  Do  not drink alcoholic beverages  If you have not urinated within 8 hours after discharge, please contact your surgeon on call. Report the following to your surgeon:  Excessive pain, swelling, redness or odor of or around the surgical area  Temperature over 100.5  Nausea and vomiting lasting longer than 4 hours or if unable to take medications  Any signs of decreased circulation or nerve impairment to extremity: change in color, persistent  numbness, tingling, coldness or increase pain  Any questions    What to do at Home:  Recommended activity: Bedrest,     If you experience any of the following symptoms chest pain, shortness of breath, fever greater than 100.5, nausea, vomiting, pain unrelieved by medication, please follow up with Torie Xaio.    *  Please give a list of your current medications to your Primary Care Provider. *  Please update this list whenever your medications are discontinued, doses are      changed, or new medications (including over-the-counter products) are added. *  Please carry medication information at all times in case of emergency situations. These are general instructions for a healthy lifestyle:    No smoking/ No tobacco products/ Avoid exposure to second hand smoke  Surgeon General's Warning:  Quitting smoking now greatly reduces serious risk to your health.     Obesity, smoking, and sedentary lifestyle greatly increases your risk for illness    A healthy diet, regular physical exercise & weight monitoring are important for maintaining a healthy lifestyle    You may be retaining fluid if you have a history of heart failure or if you experience any of the following symptoms:  Weight gain of 3 pounds or more overnight or 5 pounds in a week, increased swelling in our hands or feet or shortness of breath while lying flat in bed. Please call your doctor as soon as you notice any of these symptoms; do not wait until your next office visit. Patient armband removed and shredded   MyChart Activation    Thank you for requesting access to [employer]. Please follow the instructions below to securely access and download your online medical record. [employer] allows you to send messages to your doctor, view your test results, renew your prescriptions, schedule appointments, and more. How Do I Sign Up? In your internet browser, go to [email or web address]  Click on the First Time User? Click Here link in the Sign In box. You will be redirect to the New Member Sign Up page. Enter your [employer] Access Code exactly as it appears below. You will not need to use this code after youve completed the sign-up process. If you do not sign up before the expiration date, you must request a new code. [employer] Access Code: Activation code not generated  Current [employer] Status: Active (This is the date your [employer] access code will )    Enter the last four digits of your Social Security Number (xxxx) and Date of Birth (mm/dd/yyyy) as indicated and click Submit. You will be taken to the next sign-up page. Create a [employer] ID. This will be your [employer] login ID and cannot be changed, so think of one that is secure and easy to remember. Create a [employer] password. You can change your password at any time. Enter your Password Reset Question and Answer. This can be used at a later time if you forget your password. Enter your e-mail address. You will receive e-mail notification when new information is available in [address] Click Sign Up. You can now view and download portions of your medical record. Click the [place] link to download a portable copy of your medical information.     Additional Information    If you have questions, please visit the Frequently Asked Questions section of the [employer] website at [email or web address] Remember, [employer] is NOT to be used for urgent needs. For medical emergencies, dial 911. The discharge information has been reviewed with the {PATIENT PARENT GUARDIAN:48497}. The {PATIENT PARENT GUARDIAN:97267} verbalized understanding. Discharge medications reviewed with the {Dishcarge meds reviewed [facility]:68049} and appropriate educational materials and side effects teaching were provided.   ___________________________________________________________________________________________________________________________________

## 2022-09-23 NOTE — PROGRESS NOTES
This nurse tried  to call report to [facility] 4 times with no responds, as the phone kept on ringing to the end with no option to keep a message. Patient has been cleaned, wounds changed, and condom cath attached to a leg drainage bag per pt's request. PIV line pulled out,and patient is awaiting transportation to [facility].

## 2022-09-23 NOTE — PROGRESS NOTES
Report called to nurse Kailey Sal at [facility] rehabilitation on patient being discharged to that facility. Report included the following information SBAR, Kardex, Procedure Summary, Intake/Output, and MAR. Patient refused VS rechecked but was medicated for pain. Armband was cut off, personal belongings handed to transporters, and patient was discharged in stable condition.

## 2022-09-23 NOTE — PROGRESS NOTES
CM talked with pt at bedside to discuss pt's transitional plan of care of [address] and Nursing. CM stated insurance authorization was approved and transport has been scheduled for this date at 2. PM . CM discussed with pt a rapid covid test is needed. Pt I in agreement to this plan. [id number]-  notified Dr. Curry Officer of the above information via [address] [place] [address].            Yasmine Brennan MSW  Care Manager

## 2022-09-23 NOTE — PROGRESS NOTES
..Transition of Care Plan to SNF/Rehab    SNF/Rehab Transition:  Patient has been accepted to [facility] and meets criteria for admission. Patient will transported by [facility] and expected to leave at 2 PM.    Communication to Patient/Family:  Met with patient and pt's mother and they are agreeable to the transition plan. Communication to SNF/Rehab:  Bedside RN, Pat, has been notified to update the transition plan to the facility and call report (phone number [phone]). Discharge information has been updated on the AVS.     Discharge instructions to be fax'd to facility through James once received from MD        Nursing Please include all hard scripts for controlled substances, med rec and dc summary, and AVS in packet. Reviewed and confirmed with facility, [facility], can manage the patient care needs for the following:     [employer] with (X) only those applicable:    Medication:  []  Medications will be available at the facility  []  IV Antibiotics   []  Controlled Substance - hard copy to be sent with patient   []  Weekly Labs   Documents:  [] Hard RX  [] MAR  [] Kardex  [] AVS  []Transfer Summary  [x]Discharge Summary   Equipment:  []  CPAP/BiPAP  []  Wound Vacuum  []  [place] or Urinary Device  []  PICC/Central Line  []  Nebulizer  []  Ventilator   Treatment:  []Isolation (for MRSA, VRE, etc.)  []Surgical Drain Management  []Tracheostomy Care  []Dressing Changes  []Dialysis with transportation and chair time   []PEG Care  []Oxygen  []Daily Weights for Heart Failure   Dietary:  []Any diet limitations  []Tube Feedings   []Total Parenteral Management (TPN)   Eligible for Medicaid Long Term Services and Supports  Yes:  [] Eligible for medical assistance or will become eligible within 180 days and UAI completed. [] Provider/Patient and/or support system has requested screening.   [x] UAI copy provided to patient or responsible party, [facility]  [] UAI unavailable at discharge will send once processed to SNF provider. [] UAI unavailable at discharged mailed to patient  No:   [] Private pay and is not financially eligible for Medicaid within the next 180 days. [] Reside out-of-state.   [] A residents of a state owned/operated facility that is licensed  by [facility] or [facility]  [] Enrollment in [facility] hospice services  [] [address] [place] [address] [facility]  [] Patient /Family declines to have screening completed or provide financial information for screening     Financial Resources:  Medicaid    [] Initiated and application pending   [x] Full coverage     Advanced Care Plan:  []Surrogate Decision Maker of Care  []POA  []Communicated Code Status Full code   Other

## 2022-09-29 ENCOUNTER — TELEPHONE (OUTPATIENT)
Dept: PHYSICAL THERAPY | Age: 47
End: 2022-09-29

## 2022-10-28 NOTE — THERAPY DISCHARGE
[facility] [place] [address]  [phone] fax    Physical Therapy Discharge Summary    Patient name: Keven Bravo Start of Care: 22   Referral source: Julianne Patel MD : 1975   Medical/Treatment Diagnosis: Spasm of muscle [M62.838]  Payor: [facility] MEDICARE / Plan: [facility] [address] [facility] CCP / Product Type: Managed Care Medicare /  Onset Date:, progressive decline in BUE strength within the past 1-1.5 years                 Prior Hospitalization: see medical history Provider#: [id number]   Medications: Verified on Patient Summary List    Comorbidities: neurogenic bladder, pressure ulcer right buttocks, spinal cord injury (quadriplegia)    Prior Level of Function: power chair for all mobility, lives alone, home health aide assists with ADL's and household management                             Visits from Start of Care: 1    Missed Visits: 0    Reporting Period : 22 to 22    Summary of Care:  Short Term Goals: To be accomplished in 1 weeks  Goal: Pt will be compliant with initial HEP in order to optimize therapy outcomes   Status at evaluation/last progress note: initiated at [facility]      Long Term Goals: To be accomplished in 4 weeks:  Goal: Pt will improve FOTO by 5 points in order to demonstrate functional improvement   Status at evaluation/last progress note: 35/100     2. Goal: Pt will improve elbow extension MMT strength by 1/2 MMT grade in order to progress towards improved ease of transfers with reduced sheering and risk of skin breakdown. Status at evaluation/last progress note: 2/5 B     3. Goal:Pt will demonstrate B shoulder MMT strength to 4+/5 in order to improve ease of transfers and lifting with ADL's.     Status at evaluation/last progress note:  Shoulder Flexion Left 4+/5, Right 4/5  Shoulder Abduction Left 4+/5, Right 4/5   Shoulder IR Right 4+/5, Left 4/5  Shoulder ER Right 4+/5, Left 4/5 4.   Goal: Pt will report a little difficulty with performing light activities around his home in order to demonstrate improved ease of household management   Status at evaluation/last progress note: moderate difficulty      No goals met as pt did not return to therapy following initial evaluation       ASSESSMENT/RECOMMENDATIONS:  Pt called therapy after evaluation and self-DC, reporting he fell and broke his femur. [x]Discontinue therapy: []Patient has reached or is progressing toward set goals      [x]Patient is non-compliant or has abdicated      [x]Due to other medical issues     Elias Acevedo, PT 10/28/2022 2:42 PM     NOTE TO PHYSICIAN:  Please complete the following and fax to: [facility] at [facility] at [phone]  Retain this original for your records. If you are unable to process this request in   24 hours, please contact our office.      [] I have read the above report and request that my patient continue therapy with the following changes/special instructions:  [] I have read the above report and request that my patient be discharged from therapy    Physician's Signature:_____________________ Date:___________Time:__________

## 2022-12-01 NOTE — PROGRESS NOTES
Rosanna Araujo  1975     HISTORY OF PRESENT ILLNESS  Rosanna Araujo is a 49 y.o. male who presents today for evaluation s/p right retrograde nail femur on 9/15/22. Patient pain is a 8/10. He is a quadriplegic and states that that had not been doing any physical therapy with him at the facility. He denies any recent falls but he has notes that with muscle spasms he has hit his leg against the railing of his bed. Upon presentation to the office today his condom cath failed and patient was covered in his urination. He declined x-rays secondary to this event. Patient denies any fever, chills, chest pain, shortness of breath or calf pain. The remainder of the review of systems is negative. There are no new illness or injuries to report since last seen in the office. No changes in medications, allergies, social or family history. PHYSICAL EXAM:   There were no vitals taken for this visit. The patient is a well-developed, well-nourished male in no acute distress. The patient is alert and oriented times three. The patient appears to be well groomed. Mood and affect are normal.  ORTHOPEDIC EXAM of left leg: Inspection:  swelling not present, bruising not present, Incision well lhealed  Range of motion: Patient with contractures of both legs. Very limited range of motion of his hip and knee  ttp none  Stability: Stable  Strength: n/a/5    Imaging: X-rays were declined today    IMPRESSION:      ICD-10-CM ICD-9-CM    1. Other fracture of right femur, initial [facility]  S72.8X1A 821.00            PLAN: Patient improving postoperatively. Was able to change patient's gown and place new chucks under him. Patient left the facility with his catheter bag and toe. We will also prescribe physical therapy to work on some gentle range of motion of his leg to see if it will help with some of his worsening contractures.   Patient does request to return for x-rays when he is having a better day.  Discussed with the patient that he will need to be able to transfer over to the x-ray table.   If they are able to do this at his facility and they can send us a copy of the x-rays I would be happy to review them for him    Return to care as needed      FLAKITA Chinchilla [phone] and Spine Specialist

## 2022-12-02 ENCOUNTER — OFFICE VISIT (OUTPATIENT)
Dept: ORTHOPEDIC SURGERY | Age: 47
End: 2022-12-02
Payer: MEDICARE

## 2022-12-02 VITALS — WEIGHT: 116 LBS | HEIGHT: 69 IN | BODY MASS INDEX: 17.18 KG/M2

## 2022-12-02 DIAGNOSIS — S72.8X1A OTHER FRACTURE OF RIGHT FEMUR, INITIAL ENCOUNTER FOR CLOSED FRACTURE (HCC): Primary | ICD-10-CM

## 2022-12-02 PROCEDURE — 99024 POSTOP FOLLOW-UP VISIT: CPT | Performed by: PHYSICIAN ASSISTANT
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
## 2023-06-12 ENCOUNTER — HOSPITAL ENCOUNTER (OUTPATIENT)
Facility: HOSPITAL | Age: 48
Setting detail: RECURRING SERIES
End: 2023-06-12
Payer: MEDICARE

## 2023-06-20 ENCOUNTER — HOSPITAL ENCOUNTER (OUTPATIENT)
Facility: HOSPITAL | Age: 48
Setting detail: RECURRING SERIES
Discharge: HOME | End: 2023-06-23
Payer: MEDICARE

## 2023-06-20 PROCEDURE — 97535 SELF CARE MNGMENT TRAINING: CPT

## 2023-06-20 PROCEDURE — 97110 THERAPEUTIC EXERCISES: CPT

## 2023-06-20 NOTE — PROGRESS NOTES
PHYSICAL / OCCUPATIONAL THERAPY - DAILY TREATMENT NOTE (updated )    Patient Name: Gem Sommers    Date: 2023    : 1975  Insurance: Payor: Nick Simmons / Plan: Nick Smimons / Product Type: *No Product type* /      Patient  verified {YES/NO:77042}     Visit #   Current / Total 2 12   Time   In / Out *** ***   Pain   In / Out *** ***   Subjective Functional Status/Changes: ***     TREATMENT AREA =  General weakness [R53.1]    OBJECTIVE    {[facility] XPTR:72775}     Therapeutic Procedures: Tx Min Billable or 1:1 Min (if diff from Tx Min) Procedure, Rationale, Specifics     {[facility] Ther Procedures:07464}     Details if applicable:         {[facility] Procedures:71848}     Details if applicable:       {[facility] Procedures:57884}     Details if applicable:       {[facility] Procedures:44355}     Details if applicable:       {[facility] Procedures:50793}     Details if applicable:       [facility] Totals Reminder: bill using total billable min of TIMED therapeutic procedures (example: do not include dry needle or estim unattended, both untimed codes, in totals to left)  8-22 min = 1 unit; 23-37 min = 2 units; 38-52 min = 3 units; 53-67 min = 4 units; 68-82 min = 5 units   Total Total     [x]  Patient Education billed concurrently with other procedures   [x] Review HEP    [] Progressed/Changed HEP, detail:    [] Other detail:       Objective Information/Functional Measures/Assessment    ***    Patient will continue to benefit from skilled PT / OT services to {[facility]:70149} to address functional deficits and attain remaining goals. Progress toward goals / Updated goals:  []  See Progress Note/Recertification      Short Term Goals: To be accomplished in 1 weeks  Goal:Pt will perform WC to bed transfer with max assist and or with sliding board as required/tolerated. Status at evaluation/last progress note:NA.  Pt declined but stated he needs assist.

## 2023-06-20 NOTE — PROGRESS NOTES
PHYSICAL / OCCUPATIONAL THERAPY - DAILY TREATMENT NOTE (updated )    Patient Name: Dayna Garcia    Date: 2023    : 1975  Insurance: Payor: Lluvia Hudson / Plan: Lluvia Decreanastasia / Product Type: *No Product type* /      Patient  verified Yes     Visit #   Current / Total 2 12   Time   In / Out 2:00 2:40   Pain   In / Out -6/10 5-6/10   Subjective Functional Status/Changes: Pt reports he can tell his right knee and thigh won't go straight like it's used to and he thinks it was from not getting any therapy after the femur break. He wants to get his UE stronger so he can be more independent with his transfers again. He doesn't want to use bands he wants to get on the machine or use weights. TREATMENT AREA =  General weakness [R53.1]    OBJECTIVE    Therapeutic Procedures: Tx Min Billable or 1:1 Min (if diff from Tx Min) Procedure, Rationale, Specifics   30  68708 Therapeutic Exercise (timed):  increase ROM, strength, coordination, balance, and proprioception to improve patient's ability to progress to PLOF and address remaining functional goals. (see flow sheet as applicable)     Details if applicable:       10  62986 Self Care/Home Management (timed):  improve patient knowledge and understanding of pain reducing techniques, positioning, posture/ergonomics, home safety, activity modification, diagnosis/prognosis, and physical therapy expectations, procedures and progression  to improve patient's ability to progress to PLOF and address remaining functional goals.   (see flow sheet as applicable)     Details if applicable:     36  [facility] Totals Reminder: bill using total billable min of TIMED therapeutic procedures (example: do not include dry needle or estim unattended, both untimed codes, in totals to left)  8-22 min = 1 unit; 23-37 min = 2 units; 38-52 min = 3 units; 53-67 min = 4 units; 68-82 min = 5 units   Total Total     [x]  Patient Education billed concurrently with other procedures

## 2023-06-21 ENCOUNTER — APPOINTMENT (OUTPATIENT)
Facility: HOSPITAL | Age: 48
End: 2023-06-21
Payer: MEDICARE

## 2023-06-22 ENCOUNTER — APPOINTMENT (OUTPATIENT)
Facility: HOSPITAL | Age: 48
End: 2023-06-22
Payer: MEDICARE

## 2023-06-22 ENCOUNTER — HOSPITAL ENCOUNTER (OUTPATIENT)
Facility: HOSPITAL | Age: 48
Setting detail: RECURRING SERIES
End: 2023-06-22
Payer: MEDICARE

## 2023-06-27 ENCOUNTER — HOSPITAL ENCOUNTER (OUTPATIENT)
Facility: HOSPITAL | Age: 48
Setting detail: RECURRING SERIES
End: 2023-06-27
Payer: MEDICARE

## 2023-06-28 ENCOUNTER — APPOINTMENT (OUTPATIENT)
Facility: HOSPITAL | Age: 48
End: 2023-06-28
Payer: MEDICARE

## 2023-06-29 ENCOUNTER — APPOINTMENT (OUTPATIENT)
Facility: HOSPITAL | Age: 48
End: 2023-06-29
Payer: MEDICARE

## 2023-06-30 ENCOUNTER — APPOINTMENT (OUTPATIENT)
Facility: HOSPITAL | Age: 48
End: 2023-06-30
Payer: MEDICARE

## 2023-06-30 ENCOUNTER — HOSPITAL ENCOUNTER (OUTPATIENT)
Facility: HOSPITAL | Age: 48
Setting detail: RECURRING SERIES
End: 2023-06-30
Payer: MEDICARE

## 2023-06-30 PROCEDURE — 97112 NEUROMUSCULAR REEDUCATION: CPT

## 2023-06-30 PROCEDURE — 97110 THERAPEUTIC EXERCISES: CPT

## 2023-07-05 ENCOUNTER — APPOINTMENT (OUTPATIENT)
Facility: HOSPITAL | Age: 48
End: 2023-07-05
Payer: MEDICARE

## 2023-07-06 ENCOUNTER — HOSPITAL ENCOUNTER (OUTPATIENT)
Facility: HOSPITAL | Age: 48
Setting detail: RECURRING SERIES
Discharge: HOME | End: 2023-07-09
Payer: MEDICARE

## 2023-07-06 PROCEDURE — 97110 THERAPEUTIC EXERCISES: CPT

## 2023-07-06 PROCEDURE — 97530 THERAPEUTIC ACTIVITIES: CPT

## 2023-07-06 PROCEDURE — 97535 SELF CARE MNGMENT TRAINING: CPT

## 2023-07-07 ENCOUNTER — HOSPITAL ENCOUNTER (OUTPATIENT)
Facility: HOSPITAL | Age: 48
Setting detail: RECURRING SERIES
Discharge: HOME | End: 2023-07-10
Payer: MEDICARE

## 2023-07-07 ENCOUNTER — APPOINTMENT (OUTPATIENT)
Facility: HOSPITAL | Age: 48
End: 2023-07-07
Payer: MEDICARE

## 2023-07-07 PROCEDURE — 97110 THERAPEUTIC EXERCISES: CPT

## 2023-07-07 PROCEDURE — 97535 SELF CARE MNGMENT TRAINING: CPT

## 2023-07-14 ENCOUNTER — HOSPITAL ENCOUNTER (OUTPATIENT)
Facility: HOSPITAL | Age: 48
Setting detail: RECURRING SERIES
End: 2023-07-14
Payer: MEDICARE

## 2023-07-14 NOTE — PROGRESS NOTES
PHYSICAL / OCCUPATIONAL THERAPY - DAILY TREATMENT NOTE (updated )    Patient Name: Shimon Messer    Date: 2023    : 1975  Insurance: Payor: Brennon Cox / Plan: Brennon Cox / Product Type: *No Product type* /      Patient  verified {YES/NO:72381}     Visit #   Current / Total 6 12   Time   In / Out *** ***   Pain   In / Out *** ***   Subjective Functional Status/Changes: ***     TREATMENT AREA =  General weakness [R53.1]    OBJECTIVE    {[facility] [facility] JFVR:34375}     Therapeutic Procedures: Tx Min Billable or 1:1 Min (if diff from Tx Min) Procedure, Rationale, Specifics     {[facility] Ther Procedures:46077}     Details if applicable:         {[facility] Ther Procedures:90082}     Details if applicable:       {[facility] Ther Procedures:71474}     Details if applicable:       {[facility] Ther Procedures:02230}     Details if applicable:       {[facility] Ther Procedures:20060}     Details if applicable:       [facility] Totals Reminder: bill using total billable min of TIMED therapeutic procedures (example: do not include dry needle or estim unattended, both untimed codes, in totals to left)  8-22 min = 1 unit; 23-37 min = 2 units; 38-52 min = 3 units; 53-67 min = 4 units; 68-82 min = 5 units   Total Total     [x]  Patient Education billed concurrently with other procedures   [x] Review HEP    [] Progressed/Changed HEP, detail:    [] Other detail:       Objective Information/Functional Measures/Assessment    ***    Patient will continue to benefit from skilled PT / OT services to {[facility]:40283} to address functional deficits and attain remaining goals. Progress toward goals / Updated goals:  []  See Progress Note/Recertification  1. Goal:Pt will perform WC to bed transfer with max assist and or with sliding board as required/tolerated. Status at evaluation/last progress note: unable due to UE weakness     2.  Goal: Pt will attempt WC to Bed transfers with >25% of effort to

## 2023-07-20 ENCOUNTER — HOSPITAL ENCOUNTER (OUTPATIENT)
Facility: HOSPITAL | Age: 48
Setting detail: RECURRING SERIES
End: 2023-07-20
Payer: MEDICARE

## 2023-07-28 ENCOUNTER — HOSPITAL ENCOUNTER (OUTPATIENT)
Facility: HOSPITAL | Age: 48
Setting detail: RECURRING SERIES
Discharge: HOME | End: 2023-07-31
Payer: MEDICARE

## 2023-07-28 PROCEDURE — 97530 THERAPEUTIC ACTIVITIES: CPT

## 2023-07-28 PROCEDURE — 97535 SELF CARE MNGMENT TRAINING: CPT

## 2023-07-28 NOTE — PROGRESS NOTES
PHYSICAL / OCCUPATIONAL THERAPY - DAILY TREATMENT NOTE (updated )    Patient Name: Sunday Pulido    Date: 2023    : 1975  Insurance: Payor: Juliana Watkins / Plan: Juliana Watkins / Product Type: *No Product type* /      Patient  verified Yes     Visit #   Current / Total 6 12   Time   In / Out 2:44 3:24   Pain   In / Out 4/10 4/10   Subjective Functional Status/Changes: Rox present from dynasplint today to aide in application of neuro right brace and left ortho brace for knee extension ROM. Pt understanding majority of session will be focused on this. TREATMENT AREA =  General weakness [R53.1]    OBJECTIVE    Therapeutic Procedures: Tx Min Billable or 1:1 Min (if diff from Tx Min) Procedure, Rationale, Specifics   25  72716 Therapeutic Activity (timed):  use of dynamic activities replicating functional movements to increase ROM, strength, coordination, balance, and proprioception in order to improve patient's ability to progress to PLOF and address remaining functional goals. (see flow sheet as applicable)     Details if applicable:  donning/doffing Dynasplint; pt education; manual stretching; w/c left lateral transfer with Max A; table right w/c transfer with Max A     15  39212 Self Care/Home Management (timed):  improve patient knowledge and understanding of pain reducing techniques, positioning, posture/ergonomics, home safety, activity modification, diagnosis/prognosis, and physical therapy expectations, procedures and progression  to improve patient's ability to progress to PLOF and address remaining functional goals.   (see flow sheet as applicable)     Details if applicable:  therapy plan; dynasplint use; having home health aide assist with stretching daily   40  [facility] Totals Reminder: bill using total billable min of TIMED therapeutic procedures (example: do not include dry needle or estim unattended, both untimed codes, in totals to left)  8-22 min = 1 unit; 23-37 min = 2

## 2023-07-31 ENCOUNTER — APPOINTMENT (OUTPATIENT)
Facility: HOSPITAL | Age: 48
End: 2023-07-31
Payer: MEDICARE

## 2023-08-01 ENCOUNTER — HOSPITAL ENCOUNTER (OUTPATIENT)
Facility: HOSPITAL | Age: 48
Setting detail: RECURRING SERIES
Discharge: HOME | End: 2023-08-04
Payer: MEDICARE

## 2023-08-01 PROCEDURE — 97110 THERAPEUTIC EXERCISES: CPT

## 2023-08-01 NOTE — PROGRESS NOTES
PHYSICAL / OCCUPATIONAL THERAPY - DAILY TREATMENT NOTE (updated )    Patient Name: Kris Art    Date: 2023    : 1975  Insurance: Payor: Evans Garcia / Plan: Evans Garcia / Product Type: *No Product type* /      Patient  verified Yes     Visit #   Current / Total 7 12   Time   In / Out 1:20 2:08   Pain   In / Out 0/10 0/10   Subjective Functional Status/Changes: Pt reports no current pain. He is having Rox come to his house to show his other nurse how to put on the knee braces. He wants to work on upper body strengthening today since he has missed time doing it. TREATMENT AREA =  General weakness [R53.1]    OBJECTIVE    Therapeutic Procedures: Tx Min Billable or 1:1 Min (if diff from Tx Min) Procedure, Rationale, Specifics   48  98664 Therapeutic Exercise (timed):  increase ROM, strength, coordination, balance, and proprioception to improve patient's ability to progress to PLOF and address remaining functional goals. (see flow sheet as applicable)      Details if applicable:     50  [facility] Totals Reminder: bill using total billable min of TIMED therapeutic procedures (example: do not include dry needle or estim unattended, both untimed codes, in totals to left)  8-22 min = 1 unit; 23-37 min = 2 units; 38-52 min = 3 units; 53-67 min = 4 units; 68-82 min = 5 units   Total Total     [x]  Patient Education billed concurrently with other procedures   [x] Review HEP    [] Progressed/Changed HEP, detail:    [] Other detail:       Objective Information/Functional Measures/Assessment  Assistance for left tricep extensions   Manual resist with bench press and lateral raises  Working to fabricate some sort of hand  to avoid having to wrap hands to bar for qasim exercises  Increased sets and wrist weights for other exercises  Discussed progressing with transfers in upcoming sessions    Pt progressing with UE strengthening in therapy.  He needs to begin progressing with bed transfers

## 2023-08-04 ENCOUNTER — HOSPITAL ENCOUNTER (OUTPATIENT)
Facility: HOSPITAL | Age: 48
Setting detail: RECURRING SERIES
Discharge: HOME | End: 2023-08-07
Payer: MEDICARE

## 2023-08-04 PROCEDURE — 97530 THERAPEUTIC ACTIVITIES: CPT

## 2023-08-04 PROCEDURE — 97110 THERAPEUTIC EXERCISES: CPT

## 2023-08-04 NOTE — PROGRESS NOTES
from 0 right 28 degrees from 0   Elbow flexion MMT: 4+/5  Elbow extension MMT: left 3-/5 right 3/5    See Recertification. Patient will continue to benefit from skilled PT / OT services to modify and progress therapeutic interventions, analyze and address functional mobility deficits, analyze and address ROM deficits, analyze and address strength deficits, analyze and address soft tissue restrictions, analyze and cue for proper movement patterns, analyze and modify for postural abnormalities, analyze and address imbalance/dizziness, and instruct in home and community integration to address functional deficits and attain remaining goals.     Progress toward goals / Updated goals:  [x]  See Progress Note/Recertification      Next PN/ RC due 9/2/2023    PLAN  Yes  Continue plan of care  []  Upgrade activities as tolerated  []  Discharge due to :  []  Other:    Calli Smith PTA    8/4/2023    7:38 AM    Future Appointments   Date Time Provider [address] [place] [address]   8/4/2023  2:00 PM Calli Smith PTA [id number]   8/9/2023  2:00 PM Calli Smith PTA [id number]
0. He improved his B bicep strength to 4+/5 and his tricep strength on the right is 3/5 and left is 3-/5. He is Max A for lateral shift transfers to the table due to B knee flexion contractures and decreased UE strength. Skilled PT remains medically necessary to progress UE strength and knee extension mobility for decreased assistance with w/c<>bed transfers to improve independence and safety within his home. Certification Period: 8/5/2023 to 10/3/2023    Jackeline Vasquez, PTA 8/4/2023 3:58 PM    ________________________________________________________________________  I certify that the above Therapy Services are being furnished while the patient is under my care. I agree with the treatment plan and certify that this therapy is necessary. [] I have read the above and request that my patient continue as recommended.   [] I have read the above report and request that my patient continue therapy with the following changes/special instructions: _______________________________________  [] I have read the above report and request that my patient be discharged from therapy    Physician's Signature:____________Date:_________TIME:________     Sunday MD Jw  ** Signature, Date and Time must be completed for valid certification **    Please sign and return to [facility] - [facility] [address] [place]  [phone] fax

## 2023-08-09 ENCOUNTER — HOSPITAL ENCOUNTER (OUTPATIENT)
Facility: HOSPITAL | Age: 48
Setting detail: RECURRING SERIES
Discharge: HOME | End: 2023-08-12
Payer: MEDICARE

## 2023-08-09 PROCEDURE — 97110 THERAPEUTIC EXERCISES: CPT

## 2023-08-09 PROCEDURE — 97530 THERAPEUTIC ACTIVITIES: CPT

## 2023-08-09 NOTE — PROGRESS NOTES
plan of care  []  Upgrade activities as tolerated  []  Discharge due to :  []  Other:    Nakita Martínez PTA    8/9/2023    7:52 AM    Future Appointments   Date Time Provider [address]   8/9/2023  2:00 PM Nakita Martínez PTA [id number]

## 2023-08-28 ENCOUNTER — TELEPHONE (OUTPATIENT)
Facility: HOSPITAL | Age: 48
End: 2023-08-28

## 2023-08-28 NOTE — TELEPHONE ENCOUNTER
called about missed appointment. left message to call back to re-schedule and reminded of next appointment time.

## 2023-08-30 ENCOUNTER — HOSPITAL ENCOUNTER (OUTPATIENT)
Facility: HOSPITAL | Age: 48
Setting detail: RECURRING SERIES
Discharge: HOME | End: 2023-09-02
Payer: MEDICARE

## 2023-08-30 PROCEDURE — 97530 THERAPEUTIC ACTIVITIES: CPT

## 2023-08-30 PROCEDURE — 97110 THERAPEUTIC EXERCISES: CPT

## 2023-09-01 NOTE — PROGRESS NOTES
[facility] - [facility]  [address] [facility] [place]  [phone] fax    Medicare Progress Report    Patient name: Shaji Neil Start of Care: 2023   Referral source: Jocelyne Cheatham MD : 1975   Medical/Treatment Diagnosis: General weakness [R53.1]  Payor: Paradise Berry / Plan: Paradise Mirfletcher / Product Type: *No Product type* /  Onset Date:2022     Prior Hospitalization: see medical history Provider#: [id number]   Comorbidities: Healed Decubitus Ulcer, Muscle Spasm, Neurogenic Bowel, Quadripalegic C5-C7 incomplete. Prior Level of Function:Ambulatory until [address]. Lives alone but now has an Aide. Visits from Start of Care: 10   Missed Visits: 3    Reporting Period: 23 to 23    Subjective Reports:Pt reports assisting more with his transfers at home but still requires family or friends to bring his legs into the bed. He is [facility] to bring another splint for knee extension stretching and to ensure the nurse can assist with putting it on. Current Status/Treatment Goals:  1. Goal:Pt will perform WC to bed transfer with Min assist and or with sliding board as required/tolerated. Status at evaluation/last progress note: Max Assist  Current: Max A for LE management     2. Goal: Pt will attempt WC to Bed transfers with >25% of effort to be able to assist with tranfers. Status at evaluation/last progress note: <25% Max assist  Current: 50% to assist with scooting per subjective reports at home but needs assist for LE management     3. Goal:Pt will increase bilateral knee extension by >15 deg bilaterally to ease with bed mobility and transfers  Status at evaluation/last progress note: Knee extension PROM: left 19 degrees from 0 right 28 degrees from 0   Current: left 20 degrees from 0 right 52 degrees from 0 (increased spasticity in right today)     4.  Goal: Pt will improve bicep strength to 5/5 , tricep to 3+/5
Objective Information/Functional Measures/Assessment  W/c<>bed transfer: still requires assist of family member or friend at home for LE management  Knee extension PROM: left 20 degrees from 0 right 52 degrees from 0   Elbow flexion MMT: left 5/5 right 5/5  Elbow extension MMT: left 3-/5  right 3/5  Discussed importance of progressing transfers in upcoming sessions for functional strengthening and to work on core    See Recertification. Patient will continue to benefit from skilled PT / OT services to modify and progress therapeutic interventions, analyze and address functional mobility deficits, analyze and address ROM deficits, analyze and address strength deficits, analyze and address soft tissue restrictions, analyze and cue for proper movement patterns, analyze and modify for postural abnormalities, analyze and address imbalance/dizziness, and instruct in home and community integration to address functional deficits and attain remaining goals. Progress toward goals / Updated goals:  [x]  See Progress Note/Recertification  Goals for this certification period to be accomplished in 8 weeks  1. Goal:Pt will perform WC to bed transfer with Min assist and or with sliding board as required/tolerated. Status at evaluation/last progress note: Max Assist     2. Goal: Pt will attempt WC to Bed transfers with >25% of effort to be able to assist with tranfers. Status at evaluation/last progress note: <25% Max assist     3. Goal:Pt will increase bilateral knee extension by >15 deg bilaterally to ease with bed mobility and transfers  Status at evaluation/last progress note: Knee extension PROM: left 19 degrees from 0 right 28 degrees from 0      4. Goal: Pt will improve bicep strength to 5/5 , tricep to 3+/5 or greater to ease with transfers.    Status at evaluation/last progress note: Elbow flexion MMT: 4+/5  Elbow extension MMT: left 3-/5 right 3/5    Next PN/ RC due 9/2/2023    PLAN  Yes  Continue plan of

## 2023-09-07 ENCOUNTER — HOSPITAL ENCOUNTER (OUTPATIENT)
Facility: HOSPITAL | Age: 48
Setting detail: RECURRING SERIES
Discharge: HOME | End: 2023-09-10
Payer: MEDICARE

## 2023-09-07 PROCEDURE — 97110 THERAPEUTIC EXERCISES: CPT

## 2023-09-07 PROCEDURE — 97530 THERAPEUTIC ACTIVITIES: CPT

## 2023-09-07 NOTE — PROGRESS NOTES
keep wrists neutral with bench press  Used dysom to assist with  on bar rows  Heavy education on importance of getting dynasplints donned at home  Educated next visit working on transfers    Pt continues to progress with UE strengthening but has been limited with transfers due to not working on dynasplints at home for knee extension stretching. He needs to be able to combine working on transfers with stretching of LE in order to gain independence with w/c<>bed transfers. Patient will continue to benefit from skilled PT / OT services to modify and progress therapeutic interventions, analyze and address functional mobility deficits, analyze and address ROM deficits, analyze and address strength deficits, analyze and address soft tissue restrictions, analyze and cue for proper movement patterns, analyze and modify for postural abnormalities, analyze and address imbalance/dizziness, and instruct in home and community integration to address functional deficits and attain remaining goals. Progress toward goals / Updated goals:  [x]  See Progress Note/Recertification     Remaining unmet treatment goals from POC:  1. Goal:Pt will perform WC to bed transfer with Min assist and or with sliding board as required/tolerated. Status at evaluation/last progress note: Max Assist     2. Goal: Pt will attempt WC to Bed transfers with >25% of effort to be able to assist with tranfers. Status at evaluation/last progress note: <25% Max assist     3. Goal:Pt will increase bilateral knee extension by >15 deg bilaterally to ease with bed mobility and transfers  Status at evaluation/last progress note: Knee extension PROM: left 20 degrees from 0 right 52 degrees from 0      4. Goal: Pt will improve bicep strength to 5/5 , tricep to 3+/5 or greater to ease with transfers.    Status at evaluation/last progress note: Elbow flexion MMT: 5/5  Elbow extension MMT: left 3-/5 right 3/5    Next PN/ RC due 9/28/2023    PLAN  Yes

## 2023-09-12 ENCOUNTER — HOSPITAL ENCOUNTER (OUTPATIENT)
Facility: HOSPITAL | Age: 48
Setting detail: RECURRING SERIES
Discharge: HOME | End: 2023-09-15
Payer: MEDICARE

## 2023-09-12 PROCEDURE — 97110 THERAPEUTIC EXERCISES: CPT

## 2023-09-12 PROCEDURE — 97530 THERAPEUTIC ACTIVITIES: CPT

## 2023-09-12 NOTE — PROGRESS NOTES
PHYSICAL / OCCUPATIONAL THERAPY - DAILY TREATMENT NOTE (updated )    Patient Name: Moi Abreu    Date: 2023    : 1975  Insurance: Payor: Rogers Claros / Plan: Rogers Claros / Product Type: *No Product type* /      Patient  verified Yes     Visit #   Current / Total 4 16   Time   In / Out 2:00 2:40   Pain   In / Out 0/10 0/10   Subjective Functional Status/Changes: Pt reports he used to be able to lean forward and scoop his legs to throw them on the bed. He also used to be able to lift and scoot over at one motion when his arms were stronger. He now needs assistance for lifting his legs in bed. He was only getting in the bed by himself but was always getting help to get out of bed. TREATMENT AREA =  General weakness [R53.1]    OBJECTIVE    Therapeutic Procedures: Tx Min Billable or 1:1 Min (if diff from Tx Min) Procedure, Rationale, Specifics   10  68810 Therapeutic Exercise (timed):  increase ROM, strength, coordination, balance, and proprioception to improve patient's ability to progress to PLOF and address remaining functional goals. (see flow sheet as applicable)      Details if applicable:  see flow sheet; B knee extension stretching for TKE   30  46731 Therapeutic Activity (timed):  use of dynamic activities replicating functional movements to increase ROM, strength, coordination, balance, and proprioception in order to improve patient's ability to progress to PLOF and address remaining functional goals.   (see flow sheet as applicable)      Details if applicable:  see flow sheet; chair posture correction; therapy plan   40  [facility] Totals Reminder: bill using total billable min of TIMED therapeutic procedures (example: do not include dry needle or estim unattended, both untimed codes, in totals to left)  8-22 min = 1 unit; 23-37 min = 2 units; 38-52 min = 3 units; 53-67 min = 4 units; 68-82 min = 5 units   Total Total     [x]  Patient Education billed concurrently with other

## 2023-09-14 ENCOUNTER — HOSPITAL ENCOUNTER (OUTPATIENT)
Facility: HOSPITAL | Age: 48
Setting detail: RECURRING SERIES
Discharge: HOME | End: 2023-09-17
Payer: MEDICARE

## 2023-09-14 PROCEDURE — 97110 THERAPEUTIC EXERCISES: CPT

## 2023-09-14 PROCEDURE — 97112 NEUROMUSCULAR REEDUCATION: CPT

## 2023-09-14 PROCEDURE — 97530 THERAPEUTIC ACTIVITIES: CPT

## 2023-09-14 NOTE — PROGRESS NOTES
PHYSICAL / OCCUPATIONAL THERAPY - DAILY TREATMENT NOTE (updated )    Patient Name: Cecille Davis    Date: 2023    : 1975  Insurance: Payor: Lynette Arrington / Plan: Lynette Arrington / Product Type: *No Product type* /      Patient  verified Yes     Visit #   Current / Total 5 16   Time   In / Out 2:00 2:40   Pain   In / Out 0/10 0/10   Subjective Functional Status/Changes: Pt reports he is having a hard time getting the nurse to put on his splints. He hasn't been able to have enough upper strength to easily shift over to the bed from the w/c.      TREATMENT AREA =  General weakness [R53.1]    OBJECTIVE    Therapeutic Procedures: Tx Min Billable or 1:1 Min (if diff from Tx Min) Procedure, Rationale, Specifics   15  56612 Therapeutic Exercise (timed):  increase ROM, strength, coordination, balance, and proprioception to improve patient's ability to progress to PLOF and address remaining functional goals. (see flow sheet as applicable)      Details if applicable:  see flow sheet; B knee extension stretching for TKE   15  82736 Therapeutic Activity (timed):  use of dynamic activities replicating functional movements to increase ROM, strength, coordination, balance, and proprioception in order to improve patient's ability to progress to PLOF and address remaining functional goals. (see flow sheet as applicable)      Details if applicable:  see flow sheet; chair posture correction; therapy plan; transfer   10  51129 Neuromuscular Re-Education (timed):  improve balance, coordination, kinesthetic sense, posture, core stability and proprioception to improve patient's ability to develop conscious control of individual muscles and awareness of position of extremities in order to progress to PLOF and address remaining functional goals.  (see flow sheet as applicable)      Details if applicable: core re-ed   40  [facility] Totals Reminder: bill using total billable min of TIMED therapeutic procedures (example: do

## 2023-09-19 ENCOUNTER — HOSPITAL ENCOUNTER (OUTPATIENT)
Facility: HOSPITAL | Age: 48
Setting detail: RECURRING SERIES
Discharge: HOME | End: 2023-09-22
Payer: MEDICARE

## 2023-09-19 PROCEDURE — 97112 NEUROMUSCULAR REEDUCATION: CPT

## 2023-09-19 PROCEDURE — 97530 THERAPEUTIC ACTIVITIES: CPT

## 2023-09-19 PROCEDURE — 97110 THERAPEUTIC EXERCISES: CPT

## 2023-09-19 NOTE — PROGRESS NOTES
units; 53-67 min = 4 units; 68-82 min = 5 units   Total Total     [x]  Patient Education billed concurrently with other procedures   [x] Review HEP    [] Progressed/Changed HEP, detail:    [] Other detail:       Objective Information/Functional Measures/Assessment    Worked on triceps curls with assist as needed. . PT did sets of 5's. Nurse aide sick today. Worked on sitting balance forward lean with assist. Pt knee spastic and adducted. Left knee to almost 0 extension. Pt recommended to get a non skid foot mat for his foot rest plate to reduce his feet slipping off his foot plates on WC. Patient will continue to benefit from skilled PT / OT services to modify and progress therapeutic interventions, analyze and address functional mobility deficits, analyze and address ROM deficits, analyze and address strength deficits, analyze and address soft tissue restrictions, analyze and cue for proper movement patterns, analyze and modify for postural abnormalities, analyze and address imbalance/dizziness, and instruct in home and community integration to address functional deficits and attain remaining goals. Progress toward goals / Updated goals:  []  See Progress Note/Recertification    1. Goal:Pt will perform WC to bed transfer with Min assist and or with sliding board as required/tolerated. Status at evaluation/last progress note: Max Assist     2. Goal: Pt will attempt WC to Bed transfers with >25% of effort to be able to assist with tranfers. Status at evaluation/last progress note: <25% Max assist     3. Goal:Pt will increase bilateral knee extension by >15 deg bilaterally to ease with bed mobility and transfers  Status at evaluation/last progress note: Knee extension PROM: left 20 degrees from 0 right 52 degrees from 0      4. Goal: Pt will improve bicep strength to 5/5 , tricep to 3+/5 or greater to ease with transfers.    Status at evaluation/last progress note: Elbow flexion MMT: 5/5  Elbow

## 2023-09-21 ENCOUNTER — HOSPITAL ENCOUNTER (OUTPATIENT)
Facility: HOSPITAL | Age: 48
Setting detail: RECURRING SERIES
Discharge: HOME | End: 2023-09-24
Payer: MEDICARE

## 2023-09-21 PROCEDURE — 97530 THERAPEUTIC ACTIVITIES: CPT

## 2023-09-21 PROCEDURE — 97110 THERAPEUTIC EXERCISES: CPT

## 2023-09-21 NOTE — PROGRESS NOTES
PHYSICAL / OCCUPATIONAL THERAPY - DAILY TREATMENT NOTE (updated )    Patient Name: Kris Art    Date: 2023    : 1975  Insurance: Payor: Garfield[employer]anast[employer] / Plan: Gar[employer]anastasia [employer] / Product Type: *No Product type* /      Patient  verified Yes     Visit #   Current / Total 7 16   Time   In / Out 1:32 2:10   Pain   In / Out 0/10 0/10   Subjective Functional Status/Changes: Pt reports his nurse is still out. He wants to be able to transfer by himself. He still doesn't have enough strength and balance to be able to lean forward to grab his legs to get them in the bed. TREATMENT AREA =  General weakness [R53.1]    OBJECTIVE         Therapeutic Procedures: Tx Min Billable or 1:1 Min (if diff from Tx Min) Procedure, Rationale, Specifics   28  [id number] Therapeutic Exercise (timed):  increase ROM, strength, coordination, balance, and proprioception to improve patient's ability to progress to PLOF and address remaining functional goals. (see flow sheet as applicable)     Details if applicable:  see flow sheet     10  [id number] Therapeutic Activity (timed):  use of dynamic activities replicating functional movements to increase ROM, strength, coordination, balance, and proprioception in order to improve patient's ability to progress to PLOF and address remaining functional goals.   (see flow sheet as applicable)     Details if applicable:  transfer planning for training   45  [address] [employer] Reminder: bill using total billable min of TIMED therapeutic procedures (example: do not include dry needle or estim unattended, both untimed codes, in totals to left)  8-22 min = 1 unit; 23-37 min = 2 units; 38-52 min = 3 units; 53-67 min = 4 units; 68-82 min = 5 units   Total Total     [x]  Patient Education billed concurrently with other procedures   [x] Review HEP    [] Progressed/Changed HEP, detail:    [] Other detail:       Objective Information/Functional Measures/Assessment  Progressed exercises per flow

## 2023-10-11 ENCOUNTER — TELEPHONE (OUTPATIENT)
Facility: HOSPITAL | Age: 48
End: 2023-10-11

## 2023-10-24 ENCOUNTER — HOSPITAL ENCOUNTER (OUTPATIENT)
Facility: HOSPITAL | Age: 48
Setting detail: RECURRING SERIES
Discharge: HOME | End: 2023-10-27
Payer: MEDICARE

## 2023-10-24 PROCEDURE — 97140 MANUAL THERAPY 1/> REGIONS: CPT

## 2023-10-24 PROCEDURE — 97530 THERAPEUTIC ACTIVITIES: CPT

## 2023-10-24 NOTE — THERAPY RECERTIFICATION
[facility] - [facility] [place]ENISE  [address] [place]  [phone] fax    Continued Plan of Care/ Re-certification for Physical Therapy Services    Patient Name: Carmen Arauz : 1975   Medical   Diagnosis: General weakness [R53.1] Treatment Diagnosis: R26.89  Abnormalities of gait and mobility      Onset Date: 2022. Payor :  Payor: Jennifer Schaefer / Plan: Jennifer Schaefer / Product Type: *No Product type* /    Referral Source: Lucy Peterson MD Start of Care [facility]): 2023   Prior Hospitalization: See medical history Provider #: [id number]   Prior Level of Function: Ambulatory until [address]. Lives alone but now has an Aide. Comorbidities: Healed Decubitus Ulcer, Muscle Spasm, Neurogenic Bowel, Quadripalegic C5-C7 incomplete. Visits from Start of Care: 16    Missed Visits: 3    The Plan of Care and following information is based on the patient's current status:    1. Goal:Pt will perform WC to bed transfer with Min assist and or with sliding board as required/tolerated. Status at evaluation/last progress note: Max Assist  Current: Max A for LE management     2. Goal: Pt will attempt WC to Bed transfers with >25% of effort to be able to assist with tranfers. Status at evaluation/last progress note: <25% Max assist  Current: 50% to assist with scooting per subjective reports at home but needs assist for LE management     3. Goal:Pt will increase bilateral knee extension by >15 deg bilaterally to ease with bed mobility and transfers  Status at evaluation/last progress note: Knee extension PROM: left 19 degrees from 0 right 28 degrees from 0   Current: left 20 degrees from 0 right 52 degrees from 0 (increased spasticity in right today)  Current: left =20 deg from 0 and right =49 deg from 0     4. Goal: Pt will improve bicep strength to 5/5 , tricep to 3+/5 or greater to ease with transfers.    Status at evaluation/last progress note: Elbow

## 2023-10-25 ENCOUNTER — HOSPITAL ENCOUNTER (OUTPATIENT)
Facility: HOSPITAL | Age: 48
Setting detail: RECURRING SERIES
Discharge: HOME | End: 2023-10-28
Payer: MEDICARE

## 2023-10-25 PROCEDURE — 97165 OT EVAL LOW COMPLEX 30 MIN: CPT
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
## (undated) DEVICE — SUTURE VCRL SZ 0 L36IN ABSRB UD L36MM CT-1 1/2 CIR J946H

## (undated) DEVICE — LIMB HOLDER, WRIST/ANKLE: Brand: DEROYAL

## (undated) DEVICE — SUTURE VCRL SZ 1 L27IN ABSRB VLT CTX L48MM 1 2 CIR SGL ARMED J365H

## (undated) DEVICE — PACK PROCEDURE SURG MAJ W/ BASIN LF

## (undated) DEVICE — SPONGE LAP 18X18IN STRL -- 5/PK

## (undated) DEVICE — SUTURE MCRYL SZ 2-0 L36IN ABSRB UD L36MM CT-1 1/2 CIR Y945H

## (undated) DEVICE — BIT DRL L145MM DIA4.2MM NONSTERILE 3 FLUT NDL PNT QUIK CPL

## (undated) DEVICE — TAPE,CLOTH/SILK,CURAD,3"X10YD,LF,40/CS: Brand: CURAD

## (undated) DEVICE — DRAPE PT ISOLATN 130 IN X 96 IN

## (undated) DEVICE — GLOVE ORTHO 7 1/2   MSG9475

## (undated) DEVICE — INTENDED FOR TISSUE SEPARATION, AND OTHER PROCEDURES THAT REQUIRE A SHARP SURGICAL BLADE TO PUNCTURE OR CUT.: Brand: BARD-PARKER SAFETY BLADES SIZE 10, STERILE

## (undated) DEVICE — REM POLYHESIVE ADULT PATIENT RETURN ELECTRODE: Brand: VALLEYLAB

## (undated) DEVICE — WATERPROOF, BACTERIA PROOF DRESSING WITH ABSORBENT SEE THROUGH PAD: Brand: OPSITE POST-OP VISIBLE 25X10CM CTN 20

## (undated) DEVICE — DRESSING,GAUZE,XEROFORM,CURAD,1"X8",ST: Brand: CURAD

## (undated) DEVICE — PAD,ABDOMINAL,8"X10",ST,LF: Brand: MEDLINE

## (undated) DEVICE — ROD RMR L950MM DIA2.5MM W/ EXTN BALL TIP

## (undated) DEVICE — 4-PORT MANIFOLD: Brand: NEPTUNE 2

## (undated) DEVICE — GAUZE,SPONGE,4"X4",16PLY,STRL,LF,10/TRAY: Brand: MEDLINE

## (undated) DEVICE — BIT DRL L330MM DIA4.2MM CALIB 100MM 3 FLUT QUIK CPL

## (undated) DEVICE — KIT CLN UP BON SECOURS MARYV

## (undated) DEVICE — GARMENT,MEDLINE,DVT,INT,CALF,MED, GEN2: Brand: MEDLINE

## (undated) DEVICE — GUIDEWIRE ORTH L290MM DIA3.2MM FOR RG AG EXPERT FEM NAILING

## (undated) DEVICE — Device

## (undated) DEVICE — SUTURE VCRL SZ 3-0 L27IN ABSRB UD L26MM CT-2 1/2 CIR J232H

## (undated) DEVICE — SUTURE ABSORBABLE BRAIDED 2-0 CT-1 27 IN UD VICRYL J259H

## (undated) DEVICE — BLANKET WRM AD W50XL85.8IN PACU FULL BODY FORC AIR

## (undated) DEVICE — SOLUTION IV 1000ML 0.9% SOD CHL

## (undated) DEVICE — PREP SKN CHLRAPRP APL 26ML STR --